# Patient Record
Sex: MALE | Race: WHITE | Employment: FULL TIME | URBAN - METROPOLITAN AREA
[De-identification: names, ages, dates, MRNs, and addresses within clinical notes are randomized per-mention and may not be internally consistent; named-entity substitution may affect disease eponyms.]

---

## 2020-03-27 ENCOUNTER — APPOINTMENT (OUTPATIENT)
Dept: GENERAL RADIOLOGY | Age: 55
End: 2020-03-27
Attending: STUDENT IN AN ORGANIZED HEALTH CARE EDUCATION/TRAINING PROGRAM
Payer: COMMERCIAL

## 2020-03-27 ENCOUNTER — HOSPITAL ENCOUNTER (OUTPATIENT)
Age: 55
Setting detail: OBSERVATION
Discharge: HOME | End: 2020-03-27
Attending: EMERGENCY MEDICINE | Admitting: INTERNAL MEDICINE
Payer: COMMERCIAL

## 2020-03-27 VITALS
OXYGEN SATURATION: 97 % | WEIGHT: 190 LBS | TEMPERATURE: 97.6 F | HEART RATE: 83 BPM | HEIGHT: 66 IN | RESPIRATION RATE: 23 BRPM | BODY MASS INDEX: 30.53 KG/M2 | SYSTOLIC BLOOD PRESSURE: 114 MMHG | DIASTOLIC BLOOD PRESSURE: 61 MMHG

## 2020-03-27 DIAGNOSIS — I21.4 NON-STEMI (NON-ST ELEVATED MYOCARDIAL INFARCTION) (HCC): ICD-10-CM

## 2020-03-27 PROBLEM — I25.10 CAD (CORONARY ARTERY DISEASE): Status: ACTIVE | Noted: 2020-03-27

## 2020-03-27 LAB
ALBUMIN SERPL-MCNC: 3.9 G/DL (ref 3.5–5)
ALBUMIN/GLOB SERPL: 1.3 {RATIO} (ref 1.1–2.2)
ALP SERPL-CCNC: 66 U/L (ref 45–117)
ALT SERPL-CCNC: 22 U/L (ref 12–78)
ANION GAP SERPL CALC-SCNC: 6 MMOL/L (ref 5–15)
AST SERPL-CCNC: 16 U/L (ref 15–37)
ATRIAL RATE: 71 BPM
ATRIAL RATE: 85 BPM
BASOPHILS # BLD: 0.1 K/UL (ref 0–0.1)
BASOPHILS NFR BLD: 1 % (ref 0–1)
BILIRUB SERPL-MCNC: 0.6 MG/DL (ref 0.2–1)
BUN SERPL-MCNC: 14 MG/DL (ref 6–20)
BUN/CREAT SERPL: 14 (ref 12–20)
CALCIUM SERPL-MCNC: 8.8 MG/DL (ref 8.5–10.1)
CALCULATED P AXIS, ECG09: 2 DEGREES
CALCULATED P AXIS, ECG09: 46 DEGREES
CALCULATED R AXIS, ECG10: 102 DEGREES
CALCULATED R AXIS, ECG10: 80 DEGREES
CALCULATED T AXIS, ECG11: 37 DEGREES
CALCULATED T AXIS, ECG11: 50 DEGREES
CHLORIDE SERPL-SCNC: 107 MMOL/L (ref 97–108)
CHOLEST SERPL-MCNC: 217 MG/DL
CO2 SERPL-SCNC: 24 MMOL/L (ref 21–32)
COMMENT, HOLDF: NORMAL
CREAT SERPL-MCNC: 1.01 MG/DL (ref 0.7–1.3)
DIAGNOSIS, 93000: NORMAL
DIAGNOSIS, 93000: NORMAL
DIFFERENTIAL METHOD BLD: NORMAL
EOSINOPHIL # BLD: 0.4 K/UL (ref 0–0.4)
EOSINOPHIL NFR BLD: 4 % (ref 0–7)
ERYTHROCYTE [DISTWIDTH] IN BLOOD BY AUTOMATED COUNT: 13.2 % (ref 11.5–14.5)
GLOBULIN SER CALC-MCNC: 3.1 G/DL (ref 2–4)
GLUCOSE SERPL-MCNC: 99 MG/DL (ref 65–100)
HCT VFR BLD AUTO: 48.8 % (ref 36.6–50.3)
HDLC SERPL-MCNC: 29 MG/DL
HDLC SERPL: 7.5 {RATIO} (ref 0–5)
HGB BLD-MCNC: 16.7 G/DL (ref 12.1–17)
IMM GRANULOCYTES # BLD AUTO: 0 K/UL (ref 0–0.04)
IMM GRANULOCYTES NFR BLD AUTO: 0 % (ref 0–0.5)
LDLC SERPL CALC-MCNC: 122.8 MG/DL (ref 0–100)
LIPID PROFILE,FLP: ABNORMAL
LYMPHOCYTES # BLD: 2.3 K/UL (ref 0.8–3.5)
LYMPHOCYTES NFR BLD: 21 % (ref 12–49)
MCH RBC QN AUTO: 30.5 PG (ref 26–34)
MCHC RBC AUTO-ENTMCNC: 34.2 G/DL (ref 30–36.5)
MCV RBC AUTO: 89.1 FL (ref 80–99)
MONOCYTES # BLD: 1 K/UL (ref 0–1)
MONOCYTES NFR BLD: 9 % (ref 5–13)
NEUTS SEG # BLD: 7.2 K/UL (ref 1.8–8)
NEUTS SEG NFR BLD: 65 % (ref 32–75)
NRBC # BLD: 0 K/UL (ref 0–0.01)
NRBC BLD-RTO: 0 PER 100 WBC
P-R INTERVAL, ECG05: 170 MS
P-R INTERVAL, ECG05: 188 MS
PLATELET # BLD AUTO: 222 K/UL (ref 150–400)
PMV BLD AUTO: 10.1 FL (ref 8.9–12.9)
POTASSIUM SERPL-SCNC: 4 MMOL/L (ref 3.5–5.1)
PROT SERPL-MCNC: 7 G/DL (ref 6.4–8.2)
Q-T INTERVAL, ECG07: 390 MS
Q-T INTERVAL, ECG07: 410 MS
QRS DURATION, ECG06: 88 MS
QRS DURATION, ECG06: 98 MS
QTC CALCULATION (BEZET), ECG08: 445 MS
QTC CALCULATION (BEZET), ECG08: 464 MS
RBC # BLD AUTO: 5.48 M/UL (ref 4.1–5.7)
SAMPLES BEING HELD,HOLD: NORMAL
SODIUM SERPL-SCNC: 137 MMOL/L (ref 136–145)
TRIGL SERPL-MCNC: 326 MG/DL (ref ?–150)
TROPONIN I SERPL-MCNC: <0.05 NG/ML
VENTRICULAR RATE, ECG03: 71 BPM
VENTRICULAR RATE, ECG03: 85 BPM
VLDLC SERPL CALC-MCNC: 65.2 MG/DL
WBC # BLD AUTO: 11 K/UL (ref 4.1–11.1)

## 2020-03-27 PROCEDURE — 77030004532 HC CATH ANGI DX IMP BSC -A: Performed by: INTERNAL MEDICINE

## 2020-03-27 PROCEDURE — 74011636320 HC RX REV CODE- 636/320: Performed by: INTERNAL MEDICINE

## 2020-03-27 PROCEDURE — C1874 STENT, COATED/COV W/DEL SYS: HCPCS | Performed by: INTERNAL MEDICINE

## 2020-03-27 PROCEDURE — 74011250637 HC RX REV CODE- 250/637: Performed by: EMERGENCY MEDICINE

## 2020-03-27 PROCEDURE — 77030013744: Performed by: INTERNAL MEDICINE

## 2020-03-27 PROCEDURE — 92929 HC PLC DE STNT +/-PTA MAJOR COR VESL/BRNCH  ADD LC: CPT | Performed by: INTERNAL MEDICINE

## 2020-03-27 PROCEDURE — 99152 MOD SED SAME PHYS/QHP 5/>YRS: CPT | Performed by: INTERNAL MEDICINE

## 2020-03-27 PROCEDURE — C1725 CATH, TRANSLUMIN NON-LASER: HCPCS | Performed by: INTERNAL MEDICINE

## 2020-03-27 PROCEDURE — 80053 COMPREHEN METABOLIC PANEL: CPT

## 2020-03-27 PROCEDURE — 74011250637 HC RX REV CODE- 250/637: Performed by: INTERNAL MEDICINE

## 2020-03-27 PROCEDURE — C1769 GUIDE WIRE: HCPCS | Performed by: INTERNAL MEDICINE

## 2020-03-27 PROCEDURE — 85347 COAGULATION TIME ACTIVATED: CPT

## 2020-03-27 PROCEDURE — 77030012468 HC VLV BLEEDBK CNTRL ABBT -B: Performed by: INTERNAL MEDICINE

## 2020-03-27 PROCEDURE — 85025 COMPLETE CBC W/AUTO DIFF WBC: CPT

## 2020-03-27 PROCEDURE — 93005 ELECTROCARDIOGRAM TRACING: CPT

## 2020-03-27 PROCEDURE — 74011000250 HC RX REV CODE- 250: Performed by: INTERNAL MEDICINE

## 2020-03-27 PROCEDURE — 77030013715 HC INFL SYS MRTM -B: Performed by: INTERNAL MEDICINE

## 2020-03-27 PROCEDURE — 92928 PRQ TCAT PLMT NTRAC ST 1 LES: CPT | Performed by: INTERNAL MEDICINE

## 2020-03-27 PROCEDURE — 99218 HC RM OBSERVATION: CPT

## 2020-03-27 PROCEDURE — C1894 INTRO/SHEATH, NON-LASER: HCPCS | Performed by: INTERNAL MEDICINE

## 2020-03-27 PROCEDURE — 92978 ENDOLUMINL IVUS OCT C 1ST: CPT | Performed by: INTERNAL MEDICINE

## 2020-03-27 PROCEDURE — 99153 MOD SED SAME PHYS/QHP EA: CPT | Performed by: INTERNAL MEDICINE

## 2020-03-27 PROCEDURE — 77030010221 HC SPLNT WR POS TELE -B: Performed by: INTERNAL MEDICINE

## 2020-03-27 PROCEDURE — 74011250636 HC RX REV CODE- 250/636: Performed by: INTERNAL MEDICINE

## 2020-03-27 PROCEDURE — 77030019569 HC BND COMPR RAD TERU -B: Performed by: INTERNAL MEDICINE

## 2020-03-27 PROCEDURE — 99285 EMERGENCY DEPT VISIT HI MDM: CPT

## 2020-03-27 PROCEDURE — C1753 CATH, INTRAVAS ULTRASOUND: HCPCS | Performed by: INTERNAL MEDICINE

## 2020-03-27 PROCEDURE — 71046 X-RAY EXAM CHEST 2 VIEWS: CPT

## 2020-03-27 PROCEDURE — 93458 L HRT ARTERY/VENTRICLE ANGIO: CPT | Performed by: INTERNAL MEDICINE

## 2020-03-27 PROCEDURE — 36415 COLL VENOUS BLD VENIPUNCTURE: CPT

## 2020-03-27 PROCEDURE — 80061 LIPID PANEL: CPT

## 2020-03-27 PROCEDURE — C1887 CATHETER, GUIDING: HCPCS | Performed by: INTERNAL MEDICINE

## 2020-03-27 PROCEDURE — 84484 ASSAY OF TROPONIN QUANT: CPT

## 2020-03-27 DEVICE — XIENCE SIERRA™ EVEROLIMUS ELUTING CORONARY STENT SYSTEM 2.25 MM X 12 MM / RAPID-EXCHANGE
Type: IMPLANTABLE DEVICE | Status: FUNCTIONAL
Brand: XIENCE SIERRA™

## 2020-03-27 DEVICE — XIENCE SIERRA™ EVEROLIMUS ELUTING CORONARY STENT SYSTEM 3.25 MM X 18 MM / RAPID-EXCHANGE
Type: IMPLANTABLE DEVICE | Status: FUNCTIONAL
Brand: XIENCE SIERRA™

## 2020-03-27 RX ORDER — CLOPIDOGREL BISULFATE 75 MG/1
75 TABLET ORAL DAILY
Status: DISCONTINUED | OUTPATIENT
Start: 2020-03-28 | End: 2020-03-27 | Stop reason: HOSPADM

## 2020-03-27 RX ORDER — ASPIRIN 325 MG
325 TABLET ORAL DAILY
Qty: 90 TAB | Refills: 5 | Status: ON HOLD | OUTPATIENT
Start: 2020-03-27 | End: 2021-04-23

## 2020-03-27 RX ORDER — HEPARIN SODIUM 1000 [USP'U]/ML
INJECTION, SOLUTION INTRAVENOUS; SUBCUTANEOUS AS NEEDED
Status: DISCONTINUED | OUTPATIENT
Start: 2020-03-27 | End: 2020-03-27 | Stop reason: HOSPADM

## 2020-03-27 RX ORDER — CLOPIDOGREL 300 MG/1
TABLET, FILM COATED ORAL AS NEEDED
Status: DISCONTINUED | OUTPATIENT
Start: 2020-03-27 | End: 2020-03-27 | Stop reason: HOSPADM

## 2020-03-27 RX ORDER — MIDAZOLAM HYDROCHLORIDE 1 MG/ML
INJECTION, SOLUTION INTRAMUSCULAR; INTRAVENOUS AS NEEDED
Status: DISCONTINUED | OUTPATIENT
Start: 2020-03-27 | End: 2020-03-27 | Stop reason: HOSPADM

## 2020-03-27 RX ORDER — ATORVASTATIN CALCIUM 80 MG/1
80 TABLET, FILM COATED ORAL DAILY
Qty: 30 TAB | Refills: 5 | Status: SHIPPED | OUTPATIENT
Start: 2020-03-27 | End: 2020-07-21

## 2020-03-27 RX ORDER — SODIUM CHLORIDE 0.9 % (FLUSH) 0.9 %
5-40 SYRINGE (ML) INJECTION EVERY 8 HOURS
Status: DISCONTINUED | OUTPATIENT
Start: 2020-03-27 | End: 2020-03-27 | Stop reason: HOSPADM

## 2020-03-27 RX ORDER — LIDOCAINE HYDROCHLORIDE 10 MG/ML
INJECTION INFILTRATION; PERINEURAL AS NEEDED
Status: DISCONTINUED | OUTPATIENT
Start: 2020-03-27 | End: 2020-03-27 | Stop reason: HOSPADM

## 2020-03-27 RX ORDER — CLOPIDOGREL BISULFATE 75 MG/1
75 TABLET ORAL DAILY
Qty: 30 TAB | Refills: 5 | Status: SHIPPED | OUTPATIENT
Start: 2020-03-28 | End: 2020-08-24 | Stop reason: SDUPTHER

## 2020-03-27 RX ORDER — HEPARIN SODIUM 200 [USP'U]/100ML
INJECTION, SOLUTION INTRAVENOUS
Status: COMPLETED | OUTPATIENT
Start: 2020-03-27 | End: 2020-03-27

## 2020-03-27 RX ORDER — SODIUM CHLORIDE 0.9 % (FLUSH) 0.9 %
5-40 SYRINGE (ML) INJECTION AS NEEDED
Status: DISCONTINUED | OUTPATIENT
Start: 2020-03-27 | End: 2020-03-27 | Stop reason: HOSPADM

## 2020-03-27 RX ORDER — GUAIFENESIN 100 MG/5ML
81 LIQUID (ML) ORAL DAILY
Status: DISCONTINUED | OUTPATIENT
Start: 2020-03-28 | End: 2020-03-27 | Stop reason: HOSPADM

## 2020-03-27 RX ORDER — ASPIRIN 325 MG
325 TABLET ORAL
Status: COMPLETED | OUTPATIENT
Start: 2020-03-27 | End: 2020-03-27

## 2020-03-27 RX ORDER — SODIUM CHLORIDE 9 MG/ML
500 INJECTION, SOLUTION INTRAVENOUS CONTINUOUS
Status: DISCONTINUED | OUTPATIENT
Start: 2020-03-27 | End: 2020-03-27 | Stop reason: HOSPADM

## 2020-03-27 RX ORDER — FENTANYL CITRATE 50 UG/ML
INJECTION, SOLUTION INTRAMUSCULAR; INTRAVENOUS AS NEEDED
Status: DISCONTINUED | OUTPATIENT
Start: 2020-03-27 | End: 2020-03-27 | Stop reason: HOSPADM

## 2020-03-27 RX ORDER — ASPIRIN 325 MG
TABLET ORAL
Status: DISCONTINUED
Start: 2020-03-27 | End: 2020-03-27 | Stop reason: HOSPADM

## 2020-03-27 RX ADMIN — ASPIRIN 325 MG ORAL TABLET 325 MG: 325 PILL ORAL at 07:29

## 2020-03-27 NOTE — ED TRIAGE NOTES
Pt arrives ambulatory from home with CC of chest pain like someone is squeezing his chest, the pain radiates up his neck and under his jaw. Pt also complains of SOB more so with exertion but he does have exercise induced asthma. Pt did a virtual mateusz with [employer] and they sent him here for further cardiac workup.

## 2020-03-27 NOTE — PROGRESS NOTES
1030 -   Cardiac Cath Lab Procedure Area Arrival Note:    Phill Henry arrived to Cardiac Cath Lab, Procedure Area. Patient identifiers verified with NAME and DATE OF BIRTH. Procedure verified with patient. Consent forms verified. Allergies verified. Patient informed of procedure and plan of care. Questions answered with review. Patient voiced understanding of procedure and plan of care. Patient on cardiac monitor, non-invasive blood pressure, SPO2 monitor. Placed on O2 @ 3 lpm via NC.  IV of NS on pump at 75 ml/hr. Patient status doing well with some problems : NSTEMI. Patient is A&Ox 4. Patient reports no discomfort at this time. Patient medicated during procedure with orders obtained and verified by Dr. Mikael Cabrera. Refer to patients Cardiac Cath Lab PROCEDURE REPORT for vital signs, assessment, status, and response during procedure, printed at end of case. Printed report on chart or scanned into chart. 1219 - TRANSFER - OUT REPORT:    Verbal report given to Gerardo SWANSON(name) on Phill Henry  being transferred to (unit) for routine post - op       Report consisted of patients Situation, Background, Assessment and   Recommendations(SBAR). Information from the following report(s) Procedure Summary and MAR was reviewed with the receiving nurse. Lines:   Peripheral IV 03/27/20 Right Forearm (Active)   Site Assessment Clean, dry, & intact 3/27/2020  7:02 AM   Phlebitis Assessment 0 3/27/2020  7:02 AM   Infiltration Assessment 0 3/27/2020  7:02 AM   Dressing Status Clean, dry, & intact 3/27/2020  7:02 AM        Opportunity for questions and clarification was provided.       Patient transported with:   [employer]

## 2020-03-27 NOTE — DISCHARGE SUMMARY
Cardiology Discharge Summary     Patient ID:  Mahad Rosales  [id number]  23 y.o.  1965    Admit Date: 3/27/2020    Discharge Date: 3/27/2020     Admitting Physician: Madison Felder MD     Discharge Physician: Tracy Rodriguez MD    Admission Diagnoses: CAD (coronary artery disease) [I25.10]    Discharge Diagnoses: Active Problems:    CAD (coronary artery disease) (3/27/2020)        Discharge Condition: Good    Cardiology Procedures this Admission:  Left heart catheterization with PCI    Hospital Course: patient admitted with recent h/o cp highly s/o [place]  ecg with no acute ischemic changes and troponin normal  Underwent  Cardiac cath as below  03/27/20   CARDIAC PROCEDURE 03/27/2020 3/27/2020    Narrative Findings:  1)NSTEMI  2)Low LVEDP  3)Culprit lesions in OM2(90%) and distal LCx(90%) treated with 2.25 by 12   mm and 3.25 by 18 mm Xience FERNANDA, optimized with IVUS guidance using 2.25   and 3.5 NC balloons. 4)Mild to moderate disease in Lad and RCA were deferred for medical mg. Contrast 110 cc    Access: right radial--no issues    Recommendations:   1)Plavix based DAPT  2)Smoking cessation  3)HIgh intensity statins and GDMT for secondary prevention.        Signed by: Raul Tidwell MD     Patient completely asymptomatic from out of town ([address] )    no driving or working at this time  He will stay with his daughter in [place]  Discussed absolute need for plavix and asa with no interruptions   increase lipitor to max (ldl 122)   tobacco cessation!!          Consults: Cardiology  Visit Vitals  /51 (BP 1 Location: Left arm, BP Patient Position: At rest)   Pulse 75   Temp 97.6 °F (36.4 °C)   Resp 23   Ht 5' 6\" (1.676 m)   Wt 190 lb (86.2 kg)   SpO2 96%   BMI 30.67 kg/m²       Discharge Exam:    Visit Vitals  /51 (BP 1 Location: Left arm, BP Patient Position: At rest)   Pulse 75   Temp 97.6 °F (36.4 °C)   Resp 23   Ht 5' 6\" (1.676 m)   Wt 190 lb (86.2 kg)   SpO2 96%   BMI 30.67 kg/m² Recent Results (from the past 24 hour(s))   EKG, 12 LEAD, INITIAL    Collection Time: 03/27/20  6:51 AM   Result Value Ref Range    Ventricular Rate 85 BPM    Atrial Rate 85 BPM    P-R Interval 170 ms    QRS Duration 98 ms    Q-T Interval 390 ms    QTC Calculation (Bezet) 464 ms    Calculated P Axis 46 degrees    Calculated R Axis 102 degrees    Calculated T Axis 50 degrees    Diagnosis       Normal sinus rhythm  Nonspecific ST abnormality  No previous ECGs available  Confirmed by Elidia Cota M.D., Jasmin Stephen ([id number]) on 3/27/2020 10:11:23 AM     SAMPLES BEING HELD    Collection Time: 03/27/20  6:59 AM   Result Value Ref Range    SAMPLES BEING HELD 1BLU,1RED     COMMENT        Add-on orders for these samples will be processed based on acceptable specimen integrity and analyte stability, which may vary by analyte. CBC WITH AUTOMATED DIFF    Collection Time: 03/27/20  6:59 AM   Result Value Ref Range    WBC 11.0 4.1 - 11.1 K/uL    RBC 5.48 4.10 - 5.70 M/uL    HGB 16.7 12.1 - 17.0 g/dL    HCT 48.8 36.6 - 50.3 %    MCV 89.1 80.0 - 99.0 FL    MCH 30.5 26.0 - 34.0 PG    MCHC 34.2 30.0 - 36.5 g/dL    RDW 13.2 11.5 - 14.5 %    PLATELET 791 018 - 558 K/uL    MPV 10.1 8.9 - 12.9 FL    NRBC 0.0 0  WBC    ABSOLUTE NRBC 0.00 0.00 - 0.01 K/uL    NEUTROPHILS 65 32 - 75 %    LYMPHOCYTES 21 12 - 49 %    MONOCYTES 9 5 - 13 %    EOSINOPHILS 4 0 - 7 %    BASOPHILS 1 0 - 1 %    IMMATURE GRANULOCYTES 0 0.0 - 0.5 %    ABS. NEUTROPHILS 7.2 1.8 - 8.0 K/UL    ABS. LYMPHOCYTES 2.3 0.8 - 3.5 K/UL    ABS. MONOCYTES 1.0 0.0 - 1.0 K/UL    ABS. EOSINOPHILS 0.4 0.0 - 0.4 K/UL    ABS. BASOPHILS 0.1 0.0 - 0.1 K/UL    ABS. IMM.  GRANS. 0.0 0.00 - 0.04 K/UL    DF AUTOMATED     METABOLIC PANEL, COMPREHENSIVE    Collection Time: 03/27/20  6:59 AM   Result Value Ref Range    Sodium 137 136 - 145 mmol/L    Potassium 4.0 3.5 - 5.1 mmol/L    Chloride 107 97 - 108 mmol/L    CO2 24 21 - 32 mmol/L    Anion gap 6 5 - 15 mmol/L    Glucose 99 65 - 100 mg/dL BUN 14 6 - 20 MG/DL    Creatinine 1.01 0.70 - 1.30 MG/DL    BUN/Creatinine ratio 14 12 - 20      GFR est AA >60 >60 ml/min/1.73m2    GFR est non-AA >60 >60 ml/min/1.73m2    Calcium 8.8 8.5 - 10.1 MG/DL    Bilirubin, total 0.6 0.2 - 1.0 MG/DL    ALT (SGPT) 22 12 - 78 U/L    AST (SGOT) 16 15 - 37 U/L    Alk. phosphatase 66 45 - 117 U/L    Protein, total 7.0 6.4 - 8.2 g/dL    Albumin 3.9 3.5 - 5.0 g/dL    Globulin 3.1 2.0 - 4.0 g/dL    A-G Ratio 1.3 1.1 - 2.2     TROPONIN I    Collection Time: 03/27/20  6:59 AM   Result Value Ref Range    Troponin-I, Qt. <0.05 <0.05 ng/mL   LIPID PANEL    Collection Time: 03/27/20  6:59 AM   Result Value Ref Range    LIPID PROFILE          Cholesterol, total 217 (H) <200 MG/DL    Triglyceride 326 (H) <150 MG/DL    HDL Cholesterol 29 MG/DL    LDL, calculated 122.8 (H) 0 - 100 MG/DL    VLDL, calculated 65.2 MG/DL    CHOL/HDL Ratio 7.5 (H) 0.0 - 5.0         Disposition: home             Patient Instructions:   Asa 325 mg daily ( to be changed as outpatient to 81 mg daily)  plavix 75 mg daily  lipitor 80 mg daily      Referenced discharge instructions provided by nursing for diet and activity.     Follow-up with Dr. Wiley Walls in 1 week for tele medicine     Signed:  Malachi Alva MD  3/27/2020  1:59 PM

## 2020-03-27 NOTE — PROCEDURES
Findings:  1)NSTEMI  2)Low LVEDP  3)Culprit lesions in OM2(90%) and distal LCx(90%) treated with 2.25 by 12 mm and 3.25 by 18 mm Xience FERNANDA, optimized with IVUS guidance using 2.25 and 3.5 NC balloons. 4)Mild to moderate disease in Lad and RCA were deferred for medical mg. Contrast 110 cc    Access: right radial--no issues    Recommendations:   1)Plavix based DAPT  2)Smoking cessation  3)HIgh intensity statins and GDMT for secondary prevention.

## 2020-03-27 NOTE — Clinical Note
Right groin and right radial prepped with ChloraPrep and draped. Wet prep solution applied at: 1037. Wet prep solution dried at: 1041. Wet prep elapsed drying time: 4 mins.

## 2020-03-27 NOTE — PROGRESS NOTES
Cardiac Cath Lab Recovery Arrival Note:      Miles Liu arrived to Cardiac Cath Lab, Recovery Area. Staff introduced to patient. Patient identifiers verified with NAME and DATE OF BIRTH. Procedure verified with patient. Consent forms reviewed and signed by patient or authorized representative and verified. Allergies verified. Patient and family oriented to department. Patient and family informed of procedure and plan of care. Questions answered with review. Patient prepped for procedure, per orders from physician, prior to arrival.    Patient on cardiac monitor, non-invasive blood pressure, SPO2 monitor. On RA. Patient is A&Ox 4. Patient reports no c/o's. Patient in stretcher, in low position, with side rails up, call bell within reach, patient instructed to call if assistance as needed. Patient prep in: [address], [place] [address]. Patient family has pager # n/a  Family in: hospital.   Prep by: PRESLEY Barber

## 2020-03-27 NOTE — Clinical Note
Lesion located in the Mid OM 2. Balloon inserted. Balloon inflated using multiple inflations inflation technique. Lesion #1: Pressure = 16 jesús; Duration = 21 sec. Inflation 2: Pressure = 14 jesús; Duration = 20 sec.

## 2020-03-27 NOTE — Clinical Note
Single view of the left main, left coronary artery, LAD and circumflex artery obtained using power injection.

## 2020-03-27 NOTE — Clinical Note
Lesion located in the Mid Cx. Balloon balloon re-inflated. Balloon inflated using multiple inflations inflation technique. Lesion #1: Pressure = 20 jesús; Duration = 18 sec. Inflation 2: Pressure = 24 jesús; Duration = 20 sec.

## 2020-03-27 NOTE — PROGRESS NOTES
TRANSFER - IN REPORT:    Verbal report received from Anthony Caraballo on Keerthi Whyte  being received from procedural area for routine progression of care. Report consisted of patients Situation, Background, Assessment and Recommendations(SBAR). Information from the following report(s) Procedure Summary, MAR and Recent Results was reviewed with the receiving clinician. Opportunity for questions and clarification was provided. Assessment completed upon patients arrival to [address] [place] [address] and care assumed. Cardiac Cath Lab Recovery Arrival Note:    Keerthi Whyte arrived to [facility] recovery area. Patient procedure= C. Patient on cardiac monitor, non-invasive blood pressure, SPO2 monitor. On \ or O2 @ 2 lpm via NC.  IV  of NS on pump at 125 ml/hr. Patient status doing well without problems. Patient is A&Ox 3. Patient reports no c/o's. PROCEDURE SITE CHECK:    Procedure site:without any bleeding and hematoma, no pain/discomfort reported at procedure site. No change in patient status. Continue to monitor patient and status.

## 2020-03-27 NOTE — Clinical Note
Lesion located in the Mid OM 2. Balloon inserted. Balloon inflated using multiple inflations inflation technique. Lesion #1: Pressure = 6 jesús; Duration = 5 sec. Inflation 2: Pressure = 6 jesús; Duration = 60 sec. Inflation 3: Pressure = 8 jesús; Duration = 60 sec. Inflation 4: Pressure = 8 jesús; Duration = 45 sec.

## 2020-03-27 NOTE — Clinical Note
Lesion located in the Mid Cx. Balloon inserted. Balloon inflated using single inflation technique. Lesion #1: Pressure = 20 jesús; Duration = 30 sec.

## 2020-03-27 NOTE — Clinical Note
Lesion: Located in the Mid Cx. Stent inserted. Stent deployed. Single technique used. First inflation pressure = 12 jesús; inflation time: 31 sec.

## 2020-03-27 NOTE — Clinical Note
Lesion located in the Mid Cx. Balloon inserted. Balloon inflated using multiple inflations inflation technique. Lesion #1: Pressure = 8 jesús; Duration = 12 sec. Inflation 2: Pressure = 12 jesús; Duration = 32 sec.

## 2020-03-27 NOTE — ED NOTES
Verbal shift change report given to [address] [place] [address] [address] (oncoming nurse) by Maria Luz Gaitan RN (offgoing nurse). Report included the following information SBAR, Kardex, ED Summary, [facility] and Recent Results.

## 2020-03-27 NOTE — Clinical Note
Lesion: Located in the Mid OM 2. Stent inserted. Stent deployed. Single technique used. First inflation pressure = 10 jesús; inflation time: 46 sec.

## 2020-03-27 NOTE — CONSULTS
Cardiology Consult Note      Patient Name: Hai Pablo  : 1965 MRN: [id number]  Date: 3/27/2020  Time: 8:58 AM    ED Diagnosis: chest pain    Primary Cardiologist: none   Consulting Cardiologist: John Garcia MD    Reason for Consult: chest pain    Requesting MD: Dr. Gabriela Kraft    HPI:  Hai Pablo is a 62 y.o. male evaluated in the ED on 3/27/2020  for [place]. Pmhx for HLD, colon CA, s/p resection and tobacco abuse. He presents for a 2 week h/o progressive chest pressure/tightness. He describes the pressure as if someone is squeezing his chest and heart. When the pain gets bad enough, it radiates up his neck and jaw. Getting worse over the past 2 weeks, occurs with activity. He usually stops and rests, the pain will resolve. His morning he awoke and walked to the front door, began having chest pressure. He decided to come to the ED following a virtual mateusz the asked for s/sx, RF and other pertinent information. Stable in the ED. No Cp, SOB. EKG NSR    Subjective:  Denies CP, SOB or palpitations. No edema of legs. Assessment and Plan     1. [place]   - progressive chest tightness with radiation to neck and jaw   - occurs with activity, but now is occurring with minimal ADLs   - EKG without ACS   - RF include male, HLD, tobacco abuse, FmHx  2. Tobacco abuse   - 2 PPD for 35+ years   - discussed the need to quit  3. HLD   Cholesterol, total 217 (H) 2020 06:59 AM   HDL Cholesterol 29 2020 06:59 AM   LDL, calculated 122.8 (H) 2020 06:59 AM   VLDL, calculated 65.2 2020 06:59 AM   Triglyceride 326 (H) 2020 06:59 AM   CHOL/HDL Ratio 7.5 (H) 2020 06:59 AM    - States he does take a statin drug    [place] that is progressing, RF including tobacco abuse and FMHx. Recommend cardiac cath.   Discussed risks and benefits of cardiac cath +/- PCI and patient agrees to proceed  Risk is 1 in 100 for bleeding, prolonged hospital stay, groin complications, infection, tear in cardiac vessel, tamponade or deterioration in kidney function for patients with baseline kidney dysfunction prior to procedure  Risk is 1 in 1,000 of heart attack, stroke or death    NPO for procedures. I have seen and examined the patient and agree with PA assessment. Clinical history highly suggestive of [place]  ECG with no acute ischemic changes  Troponin normal thus far  Proceed with cath  Patient aware of risks and benefits  Increase statin prior to dc        There is no problem list on file for this patient. No specialty comments available. Review of Systems:    [] Patient unable to provide secondary to condition    [x] All systems negative, except as checked below.   Constitutional:    []Weight Change  []Fever   []Chills   []Night Sweats  []Fatigue  []Malaise  []____  ENT/Mouth:     []Hearing Changes  []Ear Pain  []Nasal Congestion   []Sinus Pain  []Hoarseness   []Sore throat  []Rhinorrhea  []Swallowing Difficulty  []____  Eyes:    []Eye Pain  []Swelling  []Redness  []Foreign Body  []Discharge  []Vision Changes  []____  Cardiovascular:    [x]Chest Pain  [x]SOB  []PND  []YANES  []Orthopnea  []Claudication  []Edema   []Palpitations  []____  Respiratory:    []Cough  []Sputum  []Wheezing,  []SOB  []Hemoptysis  []____  Gastrointestinal:    []Nausea  []Vomiting  []Diarrhea  []Constipation  []Pain  []Heartburn  []Anorexia  []Dysphagia  []Hematochezia  []Melena,  []Jaundice  []____  Genitourinary:    []Dysuria  []Urinary Frequency  []Hematuria  []Urinary Incontinence  []Urgency  []Flank Pain  []Hesitancy  []____  Musculoskeletal:    []Arthralgias  []Myalgias  []Joint Swelling  []Joint Stiffness  []Back Pain  []Neck Pain  []____  Skin:    []Skin Lesions  []Pruritis  []Hair Changes  []Skin rashes  []____  Neuro:    []Weakness  []Numbness  []Paresthesias  []Loss of Consciousness  []Syncope   []Dizziness  []Headache  []Coordination Changes []Recent Falls  []____  Psych:    []Anxiety/Depression  []Insomnia  []Memory Changes  []Violence/Abuse Hx.  []____  Heme/Lymph:    []Bruising  []Bleeding  []Lymphadenopathy  []____  Endocrine:    []Polyuria  []Polydipsia  []Temperature Intolerance  []____         Previous treatment/evaluation includes   none  Cardiac risk factors:   smoking/ tobacco exposure, family history, dyslipidemia, male gender. History reviewed. No pertinent past medical history. History reviewed. No pertinent surgical history. Current Facility-Administered Medications   Medication Dose Route Frequency    heparinized saline 2 units/mL infusion    CONTINUOUS       No Known Allergies   History reviewed. No pertinent family history. Social History     Socioeconomic History    Marital status:      Spouse name: Not on file    Number of children: Not on file    Years of education: Not on file    Highest education level: Not on file       Objective:    Physical Exam    Vitals:   Vitals:    03/27/20 0720 03/27/20 1020 03/27/20 1033 03/27/20 1037   BP: 122/81   123/81   Pulse: 84 84  80   Resp: 18   22   Temp:       SpO2: 93%   97%   Weight:   190 lb (86.2 kg)    Height:   5' 6\" (1.676 m)        General:    Alert, cooperative, no distress, appears stated age. Neck:   Supple, no carotid bruit and no JVD. Back:     Symmetric, normal curvature. Lungs:     Coarse BS to auscultation bilaterally. Heart[de-identified]    Regular rate and rhythm, S1, S2 normal, no murmur, click, rub or gallop. Abdomen:     Soft, non-tender. Bowel sounds normal.    Extremities:   Extremities normal, atraumatic, no cyanosis or edema. Vascular:   Pulses - 2+ radials   Skin:   Skin color normal. No rashes or lesions   Neurologic:   CN II-XII grossly intact.         Telemetry: normal sinus rhythm    ECG:   EKG Results     Procedure 720 Value Units Date/Time    EKG 12 LEAD INITIAL [[id number]] Collected:  03/27/20 0651    Order Status:  Completed Updated: 03/27/20 1011     Ventricular Rate 85 BPM      Atrial Rate 85 BPM      P-R Interval 170 ms      QRS Duration 98 ms      Q-T Interval 390 ms      QTC Calculation (Bezet) 464 ms      Calculated P Axis 46 degrees      Calculated R Axis 102 degrees      Calculated T Axis 50 degrees      Diagnosis --     Normal sinus rhythm  Nonspecific ST abnormality  No previous ECGs available  Confirmed by Claudio Barnes M.D., Tiarra Zarate ([id number]) on 3/27/2020 10:11:23 AM              Data Review:     Radiology:   XR Results (most recent):  Results from [place] encounter on 03/27/20   XR CHEST PA LAT    Narrative Clinical history: eval for chest pain  INDICATION:   eval for chest pain  COMPARISON: None    FINDINGS:   PA and lateral views of the chest are obtained. The cardiopericardial silhouette is within normal limits. There is no pleural  effusion, pneumothorax or focal consolidation present. Impression IMPRESSION: No acute intrathoracic disease. Recent Labs     03/27/20  0659   TROIQ <0.05     Recent Labs     03/27/20  0659      K 4.0      CO2 24   BUN 14   CREA 1.01   GLU 99   CA 8.8     Recent Labs     03/27/20  0659   WBC 11.0   HGB 16.7   HCT 48.8        Recent Labs     03/27/20  0659   SGOT 16   AP 66     Recent Labs     03/27/20  0659   CHOL 217*   LDLC 122.8*     No results for input(s): CRP, TSH, TSHEXT, TSHEXT in the last 72 hours. No lab exists for component: ESR    Carri Kenny.  Daniella Rajan MD         [facility] [address] [place] [address],[address]     [place] [id number]

## 2020-03-27 NOTE — Clinical Note
Lesion located in the Mid Cx. Balloon inserted. Balloon inflated using single inflation technique. Lesion #1: Pressure = 12 jesús; Duration = 35 sec.

## 2020-03-27 NOTE — ED PROVIDER NOTES
HPI     Pt is a 62 y.o. M with PMH of asthma here with c/o chest pain and shortness of breath intermittent x 2 weeks. Pt says is more often with exertion but occasionally also occurs at rest.  This AM it occurred while trying to walk his dog. It feels like a tightness to his chest and radiates up to his neck. It lasts about 5 minutes when it occurs. He only has shortness of breath with no other associated symptoms. He takes Aleve daily for knee pain but denies other medications. No h/o PE/DVT and no risk factors. He does smoke and has family hx of CAD. No other complaints at this time, and he does not have chest pain at this time. History reviewed. No pertinent past medical history. History reviewed. No pertinent surgical history. History reviewed. No pertinent family history.     Social History     Socioeconomic History    Marital status: Not on file     Spouse name: Not on file    Number of children: Not on file    Years of education: Not on file    Highest education level: Not on file   Occupational History    Not on file   Social Needs    Financial resource strain: Not on file    Food insecurity     Worry: Not on file     Inability: Not on file    Transportation needs     Medical: Not on file     Non-medical: Not on file   Tobacco Use    Smoking status: Not on file   Substance and Sexual Activity    Alcohol use: Not on file    Drug use: Not on file    Sexual activity: Not on file   Lifestyle    Physical activity     Days per week: Not on file     Minutes per session: Not on file    Stress: Not on file   Relationships    Social connections     Talks on phone: Not on file     Gets together: Not on file     Attends [employer] service: Not on file     Active member of club or organization: Not on file     Attends meetings of clubs or organizations: Not on file     Relationship status: Not on file    Intimate partner violence     Fear of current or ex partner: Not on file Emotionally abused: Not on file     Physically abused: Not on file     Forced sexual activity: Not on file   Other Topics Concern    Not on file   Social History Narrative    Not on file         ALLERGIES: Patient has no known allergies. Review of Systems   Constitutional: Negative for chills, diaphoresis and fever. HENT: Negative for congestion and trouble swallowing. Eyes: Negative for photophobia and visual disturbance. Respiratory: Positive for shortness of breath. Negative for cough and chest tightness. Cardiovascular: Positive for chest pain. Negative for palpitations and leg swelling. Gastrointestinal: Negative for abdominal pain, diarrhea, nausea and vomiting. Genitourinary: Negative for difficulty urinating, dysuria, flank pain and frequency. Musculoskeletal: Positive for arthralgias (chronic knee pain) and neck pain. Negative for back pain and myalgias. Skin: Negative for rash and wound. Neurological: Negative for dizziness, weakness, light-headedness and headaches. Hematological: Negative for adenopathy. Does not bruise/bleed easily. Psychiatric/Behavioral: Negative for agitation and confusion. All other systems reviewed and are negative. Vitals:    03/27/20 0634   BP: 148/85   Pulse: 86   Resp: 16   Temp: 97.6 °F (36.4 °C)   SpO2: 98%            Physical Exam  Vitals signs and nursing note reviewed. Constitutional:       General: He is not in acute distress. Appearance: He is well-developed. He is not diaphoretic. HENT:      Head: Normocephalic. Eyes:      Conjunctiva/sclera: Conjunctivae normal.      Pupils: Pupils are equal, round, and reactive to light. Neck:      Musculoskeletal: Normal range of motion and neck supple. Vascular: No JVD. Cardiovascular:      Rate and Rhythm: Normal rate and regular rhythm. Heart sounds: Normal heart sounds. Pulmonary:      Effort: Pulmonary effort is normal.      Breath sounds: Normal breath sounds. Abdominal:      General: Bowel sounds are normal. There is no distension. Palpations: Abdomen is soft. Tenderness: There is no abdominal tenderness. Musculoskeletal: Normal range of motion. General: No tenderness or deformity. Lymphadenopathy:      Cervical: No cervical adenopathy. Skin:     General: Skin is warm and dry. Capillary Refill: Capillary refill takes less than 2 seconds. Findings: No erythema or rash. Neurological:      Mental Status: He is alert and oriented to person, place, and time. Cranial Nerves: No cranial nerve deficit. Sensory: No sensory deficit. MDM       Procedures    ED EKG interpretation:  Rhythm: normal sinus rhythm; and regular . Rate (approx.): 85; Axis: normal; P wave: normal; QRS interval: normal ; ST/T wave: non-specific ST abnormality  EKG documented by Law Smith MD, as interpreted by Dilia Weaver MD, ED MD.      8:02 AM  Will consult cardiology for help with dispo. Pt has low HEART score. However, he has tightness and dyspnea worsening with exertion. Previously he could do a lot of work, ie yard work, without pain but now worse with little exertion like this AM.  EKG with nonspecific ST findings and normal trop after 2 weeks of intermittent sx. May benefit from stress or cath from what seems to be unstable angina at the very least.    CONSULT NOTE:  8:51 Melissa Kessler MD spoke with Dr. Rashida Reis for Cardiology. Discussed available diagnostic tests and clinical findings.   Dr. Marylen Gale and team plan to admit and cath     Law Smith MD

## 2020-03-27 NOTE — PROGRESS NOTES
Pt d/c'ed home with wife. Discharge instructions given and understanding verbalized of follow up. Right wrist dressing is dry and intact upon discharge, no bleeding or hematoma noted. Pt ambulated before discharge and tolerated well. Pt voices no c/o's upon discharge. Pt d/c'ed out via w/c.

## 2020-03-29 LAB
ACT BLD: 257 SECS (ref 79–138)
ACT BLD: 323 SECS (ref 79–138)

## 2020-03-30 ENCOUNTER — TELEPHONE (OUTPATIENT)
Dept: CARDIOLOGY CLINIC | Age: 55
End: 2020-03-30

## 2020-03-30 ENCOUNTER — TELEPHONE (OUTPATIENT)
Dept: CARDIAC REHAB | Age: 55
End: 2020-03-30

## 2020-03-30 NOTE — TELEPHONE ENCOUNTER
Identifiers x 2. Confirmed restrictions lifted 7 days after procedure.       Future Appointments   Date Time Provider Nic Geni   4/14/2020 10:20 AM Loraine Penny  [address]

## 2020-03-30 NOTE — TELEPHONE ENCOUNTER
3/30/2020 Cardiac Rehab: Called Mr. Mela Mitchell  to discuss participation in the Cardiac Rehab Program following cardiac stent on 3/27/2020. Spoke with the pt. and he was referred to [facility] which is closest to his home. Contact information given. Answered questions regarding plavix, the purpose and the side effects to be reported. Mela Mitchell verbalized understanding.   Emily Perry RN

## 2020-03-30 NOTE — TELEPHONE ENCOUNTER
Patient calling to schedule his appt.     He can be reached at [phone]    [facility]

## 2020-04-02 ENCOUNTER — TELEPHONE (OUTPATIENT)
Dept: CARDIOLOGY CLINIC | Age: 55
End: 2020-04-02

## 2020-04-02 NOTE — TELEPHONE ENCOUNTER
Patient is calling as he had two stents put in on Friday and would like to e-mail some paperwork to Dr. Monique Tanner for disability. Advised patient we do not have e-mail and would like to speak with you to see what he can do. Please advise.     Phone: [phone]

## 2020-04-02 NOTE — TELEPHONE ENCOUNTER
2 pt identifiers used  Pt reports he has already taken care of paperwork. He contacted disability & he is having them send paperwork to us. Instructed him to CB in about a week to make sure we received everything.  He verbalized his understanding

## 2020-04-09 ENCOUNTER — TELEPHONE (OUTPATIENT)
Dept: CARDIOLOGY CLINIC | Age: 55
End: 2020-04-09

## 2020-04-09 NOTE — TELEPHONE ENCOUNTER
2 pt identifiers used  Pt wants update on disability forms. He reports forms were faxed in by his ins rep on Friday.  He wants call back at [phone]

## 2020-04-13 ENCOUNTER — TELEPHONE (OUTPATIENT)
Dept: CARDIOLOGY CLINIC | Age: 55
End: 2020-04-13

## 2020-04-13 NOTE — TELEPHONE ENCOUNTER
Patient following up on paperwork that was faxed to #[phone] for his disability paperwork. Please advise.      Phone: [phone]

## 2020-04-14 ENCOUNTER — VIRTUAL VISIT (OUTPATIENT)
Dept: CARDIOLOGY CLINIC | Age: 55
End: 2020-04-14

## 2020-04-14 VITALS
HEIGHT: 66 IN | BODY MASS INDEX: 30.44 KG/M2 | SYSTOLIC BLOOD PRESSURE: 121 MMHG | DIASTOLIC BLOOD PRESSURE: 77 MMHG | WEIGHT: 189.4 LBS

## 2020-04-14 DIAGNOSIS — E78.49 OTHER HYPERLIPIDEMIA: ICD-10-CM

## 2020-04-14 DIAGNOSIS — Z72.0 TOBACCO ABUSE: ICD-10-CM

## 2020-04-14 DIAGNOSIS — I25.10 CORONARY ARTERY DISEASE INVOLVING NATIVE CORONARY ARTERY OF NATIVE HEART WITHOUT ANGINA PECTORIS: Primary | ICD-10-CM

## 2020-04-14 DIAGNOSIS — R07.9 CHEST PAIN, UNSPECIFIED TYPE: ICD-10-CM

## 2020-04-14 RX ORDER — OMEPRAZOLE 40 MG/1
40 CAPSULE, DELAYED RELEASE ORAL 2 TIMES DAILY
COMMUNITY
End: 2020-07-20

## 2020-04-14 RX ORDER — ISOSORBIDE MONONITRATE 30 MG/1
30 TABLET, EXTENDED RELEASE ORAL DAILY
Qty: 90 TAB | Refills: 1 | Status: SHIPPED | OUTPATIENT
Start: 2020-04-14 | End: 2020-04-14 | Stop reason: SDUPTHER

## 2020-04-14 RX ORDER — MOMETASONE FUROATE AND FORMOTEROL FUMARATE DIHYDRATE 100; 5 UG/1; UG/1
2 AEROSOL RESPIRATORY (INHALATION) 2 TIMES DAILY
COMMUNITY

## 2020-04-14 RX ORDER — ISOSORBIDE MONONITRATE 30 MG/1
30 TABLET, EXTENDED RELEASE ORAL DAILY
Qty: 90 TAB | Refills: 1 | Status: SHIPPED | OUTPATIENT
Start: 2020-04-14 | End: 2020-07-20

## 2020-04-14 NOTE — TELEPHONE ENCOUNTER
Disability forms completed and signed by Dr. Cathi Lepe. Faxed to [address] [place] [address] at [phone]. Confirmation received. Copy sent to patient for his records. Virtual visit today. Informed patient of the above.

## 2020-04-14 NOTE — PROGRESS NOTES
[facility] Telemedicine Encounter                                                         Pursuant to the emergency declaration under the [facility] waiver authority and the [employer] Act, this Virtual  Visit was conducted, with patient's consent, to reduce the patient's risk of exposure to COVID-19 and provide continuity of care for an established patient. Services were provided through a video synchronous discussion virtually to substitute for in-person clinic visit. Subjective      He is doing well but still occasional cp even if not significant as before   lasting about 1 minute and not necessarily limiting his activities   no radiation no palpitations   he tells me his sob has improved significantly after stents    HPI  Patient with a history of hyperlipidemia, colon cancer status post resection and tobacco abuse who presented to [facility] emergency room on March 27, 2020 with chest pain suggestive of unstable angina. The patient did rule out for myocardial infarction but given the high clinical suspicion for unstable angina he underwent cardiac catheterization as noted below. No past medical history on file. No past surgical history on file. Social History     Tobacco Use    Smoking status: Not on file   Substance Use Topics    Alcohol use: Not on file    Drug use: Not on file                 03/27/20   CARDIAC PROCEDURE 03/27/2020 3/27/2020    Narrative Findings:  1)NSTEMI  2)Low LVEDP  3)Culprit lesions in OM2(90%) and distal LCx(90%) treated with 2.25 by 12   mm and 3.25 by 18 mm Xience FERNANDA, optimized with IVUS guidance using 2.25   and 3.5 NC balloons. 4)Mild to moderate disease in Lad and RCA were deferred for medical mg.     Contrast 110 cc    Access: right radial--no issues    Recommendations:   1)Plavix based DAPT  2)Smoking cessation  3)HIgh intensity statins and GDMT for secondary prevention. Signed by: Ayan Peres MD           Visit Vitals  /77 (BP 1 Location: Left arm, BP Patient Position: Sitting)   Ht 5' 6\" (1.676 m)   Wt 189 lb 6.4 oz (85.9 kg)   BMI 30.57 kg/m²         Wt Readings from Last 3 Encounters:   04/14/20 189 lb 6.4 oz (85.9 kg)   03/27/20 190 lb (86.2 kg)           Review of Symptoms  11 systems reviewed, negative other than as stated in the HPI    Physical Exam:    Due to this being a TeleHealth evaluation, many elements of the physical examination are unable to be assessed. General: Well developed, in no acute distress, cooperative and alert  HEENT: Pupils equal/round. No marked JVD visible on video. Respiratory: No audible wheezing, no signs of respiratory distress, lips non cyanotic  Extremities:  No edema  Neuro: A&Ox3, speech clear, no facial droop, answering questions appropriately  Skin: Skin color is normal. No rashes or lesions. Non diaphoretic on visible skin during exam          Current Outpatient Medications on File Prior to Visit   Medication Sig Dispense Refill    mometasone-formoterol (Dulera) 100-5 mcg/actuation HFA inhaler Take 2 Puffs by inhalation two (2) times a day.  omeprazole (PRILOSEC) 40 mg capsule Take 40 mg by mouth two (2) times a day.  aspirin (ASPIRIN) 325 mg tablet Take 1 Tab by mouth daily. 90 Tab 5    clopidogreL (PLAVIX) 75 mg tab Take 1 Tab by mouth daily. 30 Tab 5    atorvastatin (LIPITOR) 80 mg tablet Take 1 Tab by mouth daily. 30 Tab 5     No current facility-administered medications on file prior to visit.              Lab Results   Component Value Date/Time    Cholesterol, total 217 (H) 03/27/2020 06:59 AM    HDL Cholesterol 29 03/27/2020 06:59 AM    LDL, calculated 122.8 (H) 03/27/2020 06:59 AM    VLDL, calculated 65.2 03/27/2020 06:59 AM    Triglyceride 326 (H) 03/27/2020 06:59 AM    CHOL/HDL Ratio 7.5 (H) 03/27/2020 06:59 AM         Lab Results   Component Value Date/Time    Sodium 137 03/27/2020 06:59 AM    Potassium 4.0 03/27/2020 06:59 AM    Chloride 107 03/27/2020 06:59 AM    CO2 24 03/27/2020 06:59 AM    Anion gap 6 03/27/2020 06:59 AM    Glucose 99 03/27/2020 06:59 AM    BUN 14 03/27/2020 06:59 AM    Creatinine 1.01 03/27/2020 06:59 AM    BUN/Creatinine ratio 14 03/27/2020 06:59 AM    GFR est AA >60 03/27/2020 06:59 AM    GFR est non-AA >60 03/27/2020 06:59 AM    Calcium 8.8 03/27/2020 06:59 AM         Lab Results   Component Value Date/Time    ALT (SGPT) 22 03/27/2020 06:59 AM    AST (SGOT) 16 03/27/2020 06:59 AM    Alk. phosphatase 66 03/27/2020 06:59 AM    Bilirubin, total 0.6 03/27/2020 06:59 AM         Lab Results   Component Value Date/Time    WBC 11.0 03/27/2020 06:59 AM    HGB 16.7 03/27/2020 06:59 AM    HCT 48.8 03/27/2020 06:59 AM    PLATELET 527 46/57/4235 06:59 AM    MCV 89.1 03/27/2020 06:59 AM             Assessment  /Plan  :  1.  CAD: He has some recurrent chest discomfort with change some ways is atypical in other ways typical for angina. Clearly the chest pain is much shorter and not necessary limit his activities like the previous chest pain. We have discussed the presence of residual coronary artery disease in the LAD with 40% stenosis in the RCA with a 50% stenosis. He is status post PCI of obtuse marginal and circumflex artery in March 2020. Continue aspirin Plavix and high potency statin. Possibility of vasospasm is been discussed with him. I will start a trial of Imdur 30 mg daily. Side effects have been explained. I have asked the patient to absolutely avoid any erectile dysfunction medication at this point. We have discussed exercise. He will try to walk 150 minutes a week. He does have a very physical job. Given the fact that he continues to have occasional chest discomfort he will continue to be on this the ability until the next office visit.     Given the COVID situation unfortunately stress tests are on hold at this point but I will schedule stress echocardiogram at the beginning of June. 2.  Hyperlipidemia: On the higher dose of Lipitor at this time. Proceed with lipids and liver function tests in June. 3.  Tobacco abuse: Discussed with him the importance of stopping tobacco use. He tells me he has diminished amount of cigarettes but still smoking. 4.  Colon CA: Status post colon resection and closely followed by his primary care physician. See him back in June for follow-up either virtually or in person depending current COVID situation. Discussed disability forms and disability in general.  Disability forms all filled. We discussed the expected course, resolution and complications of the diagnosis(es) in detail. Medication risks, benefits, costs, interactions, and alternatives were discussed as indicated. I advised him   to contact the office if her condition worsens, changes or fails to improve as anticipated. he expressed understanding with the diagnosis(es) and plan        Jacquelyn Coffey MD      Greater than 20 minutes was spent in direct video patient care, planning and chart review. This visit was conducted using [employer] Me telemedicine services.

## 2020-04-14 NOTE — PATIENT INSTRUCTIONS
Start taking imdur 30 mg daily. You will be scheduled for a stress echocardiogram after your appointment today. Please wear comfortable clothing (shorts or pants with a shirt or blouse) and walking/athletic shoes. Do not eat or drink anything, except water, for at least 2 hours prior to your test. 
 
Do  take your scheduled medications prior to your test. 
 
Have fasting labs obtained in June/2020. Follow up with Dr. Jerry Wasserman 1 week after the above.

## 2020-04-15 ENCOUNTER — TELEPHONE (OUTPATIENT)
Dept: CARDIOLOGY CLINIC | Age: 55
End: 2020-04-15

## 2020-04-15 DIAGNOSIS — I25.118 CORONARY ARTERY DISEASE OF NATIVE ARTERY OF NATIVE HEART WITH STABLE ANGINA PECTORIS (HCC): Primary | ICD-10-CM

## 2020-04-16 RX ORDER — NITROGLYCERIN 0.4 MG/1
0.4 TABLET SUBLINGUAL
Qty: 1 BOTTLE | Refills: 4 | Status: ON HOLD | OUTPATIENT
Start: 2020-04-16 | End: 2021-04-23

## 2020-04-16 NOTE — TELEPHONE ENCOUNTER
Request for SL nitro PRN. Last office visit 4-14-20, next office visit 7-7-20. Refills per verbal order from Dr. Laura Herrera.

## 2020-04-30 ENCOUNTER — TELEPHONE (OUTPATIENT)
Dept: CARDIOLOGY CLINIC | Age: 55
End: 2020-04-30

## 2020-04-30 NOTE — TELEPHONE ENCOUNTER
Copy of completed disability form faxed to [facility] of [address] Confirmation received.

## 2020-06-19 ENCOUNTER — TELEPHONE (OUTPATIENT)
Dept: CARDIOLOGY CLINIC | Age: 55
End: 2020-06-19

## 2020-06-19 NOTE — TELEPHONE ENCOUNTER
Spoke with pt re: covid 19 protocol for stress testing. Pt informed he must get COVID test on Monday June 22 between 8am and 12pm prior to stress test on June 25. Advised pt he needed to limit exposure after covid test and avoid crpwded areas until stress test in completed. Pt verbalized understanding.   Order faxed to [phone] per pt request

## 2020-06-22 ENCOUNTER — OFFICE VISIT (OUTPATIENT)
Dept: PRIMARY CARE CLINIC | Age: 55
End: 2020-06-22

## 2020-06-22 DIAGNOSIS — Z01.818 PREOP TESTING: Primary | ICD-10-CM

## 2020-06-22 NOTE — Clinical Note
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 LKW 2:00am 9/22 at a family gathering. History obtained from patients children at bedside, who interpreted for the patient. Patients sister heard her get up and ambulate to the bathroom at 5:00am 9/22, however when family members went to wake her up for temple this morning around 9:00am, they found her unable to speak or move her R side. NIHSS: 19. CT Head showed L MCA acute-subacute infarct. CTA head demonstrated L M2 occlusion. Patient was not a candidate for endovascular treatment given core infarct of 0 with penumbra of 7.

## 2020-06-23 LAB
ALBUMIN SERPL-MCNC: 4.6 G/DL (ref 3.8–4.9)
ALP SERPL-CCNC: 65 IU/L (ref 39–117)
ALT SERPL-CCNC: 19 IU/L (ref 0–44)
AST SERPL-CCNC: 18 IU/L (ref 0–40)
BILIRUB DIRECT SERPL-MCNC: 0.14 MG/DL (ref 0–0.4)
BILIRUB SERPL-MCNC: 0.6 MG/DL (ref 0–1.2)
CHOLEST SERPL-MCNC: 158 MG/DL (ref 100–199)
HDLC SERPL-MCNC: 31 MG/DL
INTERPRETATION, 910389: NORMAL
LDLC SERPL CALC-MCNC: 98 MG/DL (ref 0–99)
PROT SERPL-MCNC: 6.4 G/DL (ref 6–8.5)
TRIGL SERPL-MCNC: 147 MG/DL (ref 0–149)
VLDLC SERPL CALC-MCNC: 29 MG/DL (ref 5–40)

## 2020-06-24 ENCOUNTER — TELEPHONE (OUTPATIENT)
Dept: CARDIOLOGY CLINIC | Age: 55
End: 2020-06-24

## 2020-06-24 LAB — SARS-COV-2, NAA: NOT DETECTED

## 2020-06-24 NOTE — TELEPHONE ENCOUNTER
Left msg covid test results are not back. Test cancelled for 9:00am and I will call him in the am to r/s.

## 2020-06-24 NOTE — TELEPHONE ENCOUNTER
Kari Jonas w/ [facility] stated that the patient wishes to start cardiac rehab with them but they will need an order. She stated that he plans to start on Tuesday. Please advise.     Phone #: [phone]  Fax #: [phone]  Thanks

## 2020-06-25 NOTE — PROGRESS NOTES
You had a NEGATIVE COVID test. You still need to use good handwashing, wear a mask in public indoor places and practice social distancing. If you need anything please contact the office.    Claudis Mcburney, ANP

## 2020-06-29 NOTE — TELEPHONE ENCOUNTER
Austin Mock is calling to follow up on order for patient to start rehab with them tomorrow. She states order was faxed to our office on 6/24/20 and would like to know if she can do anything to have the order faxed back to them by end of day today. Please advise.     She can be be reached at  [phone]

## 2020-06-29 NOTE — TELEPHONE ENCOUNTER
Left message at cardiac rehab office. Signed order for cardiac rehab faxed. Confirmation received.      Future Appointments   Date Time Provider Nic Arechiga   7/20/2020  2:20 PM José Antonio Candelario  [address]

## 2020-07-20 ENCOUNTER — VIRTUAL VISIT (OUTPATIENT)
Dept: CARDIOLOGY CLINIC | Age: 55
End: 2020-07-20

## 2020-07-20 DIAGNOSIS — I21.4 NON-STEMI (NON-ST ELEVATED MYOCARDIAL INFARCTION) (HCC): ICD-10-CM

## 2020-07-20 DIAGNOSIS — E78.49 OTHER HYPERLIPIDEMIA: ICD-10-CM

## 2020-07-20 DIAGNOSIS — I25.10 CORONARY ARTERY DISEASE INVOLVING NATIVE CORONARY ARTERY OF NATIVE HEART WITHOUT ANGINA PECTORIS: Primary | ICD-10-CM

## 2020-07-20 NOTE — PROGRESS NOTES
[facility] Cardiology Telemedicine Encounter                                                         Pursuant to the emergency declaration under the [facility], [facility] waiver authority and the [employer] Act, this Virtual  Visit was conducted, with patient's consent, to reduce the patient's risk of exposure to COVID-19 and provide continuity of care for an established patient. Services were provided through a video synchronous discussion virtually to substitute for in-person clinic visit. Subjective    Cp has now resolved  He is doing well with no issues        HPI    Patient with a history of hyperlipidemia, colon cancer status post resection and tobacco abuse who presented to [facility] emergency room on March 27, 2020 with chest pain suggestive of unstable angina.     The patient did rule out for myocardial infarction but given the high clinical suspicion for unstable angina he underwent cardiac catheterization as noted below. Quit smoking on 7/20 06/25/20   ECHO STRESS 07/06/2020 7/8/2020    Narrative · Normal stress echocardiogram. Low risk study. · Baseline ECG: Normal sinus rhythm. · Low risk Duke treadmill score. · Negative stress test.        Signed by: Isabel Badillo MD                03/27/20   CARDIAC PROCEDURE 03/27/2020 3/27/2020    Narrative Findings:  1)NSTEMI  2)Low LVEDP  3)Culprit lesions in OM2(90%) and distal LCx(90%) treated with 2.25 by 12   mm and 3.25 by 18 mm Xience FERNANDA, optimized with IVUS guidance using 2.25   and 3.5 NC balloons. 4)Mild to moderate disease in Lad and RCA were deferred for medical mg. Contrast 110 cc    Access: right radial--no issues    Recommendations:   1)Plavix based DAPT  2)Smoking cessation  3)HIgh intensity statins and GDMT for secondary prevention. Signed by: Zarina Arias MD             No past medical history on file.       No past surgical history on file. Social History     Tobacco Use    Smoking status: Not on file   Substance Use Topics    Alcohol use: Not on file    Drug use: Not on file             There were no vitals taken for this visit. Wt Readings from Last 3 Encounters:   06/25/20 85.7 kg (189 lb)   04/14/20 85.9 kg (189 lb 6.4 oz)   03/27/20 86.2 kg (190 lb)           Review of Symptoms  11 systems reviewed, negative other than as stated in the HPI    Physical Exam:    Due to this being a TeleHealth evaluation, many elements of the physical examination are unable to be assessed. General: Well developed, in no acute distress, cooperative and alert  HEENT: Pupils equal/round. No marked JVD visible on video. Respiratory: No audible wheezing, no signs of respiratory distress, lips non cyanotic  Extremities:  No edema  Neuro: A&Ox3, speech clear, no facial droop, answering questions appropriately  Skin: Skin color is normal. No rashes or lesions. Non diaphoretic on visible skin during exam          Current Outpatient Medications on File Prior to Visit   Medication Sig Dispense Refill    nitroglycerin (NITROSTAT) 0.4 mg SL tablet 1 Tab by SubLINGual route every five (5) minutes as needed for Chest Pain. Do not exceed 3 doses in 24 hours. 1 Bottle 4    mometasone-formoterol (Dulera) 100-5 mcg/actuation HFA inhaler Take 2 Puffs by inhalation two (2) times a day.  aspirin (ASPIRIN) 325 mg tablet Take 1 Tab by mouth daily. 90 Tab 5    clopidogreL (PLAVIX) 75 mg tab Take 1 Tab by mouth daily. 30 Tab 5    atorvastatin (LIPITOR) 80 mg tablet Take 1 Tab by mouth daily. 30 Tab 5     No current facility-administered medications on file prior to visit.              Lab Results   Component Value Date/Time    Cholesterol, total 158 06/22/2020 11:47 AM    HDL Cholesterol 31 (L) 06/22/2020 11:47 AM    LDL, calculated 98 06/22/2020 11:47 AM    VLDL, calculated 29 06/22/2020 11:47 AM    Triglyceride 147 06/22/2020 11:47 AM    CHOL/HDL Ratio 7.5 (H) 03/27/2020 06:59 AM         Lab Results   Component Value Date/Time    Sodium 137 03/27/2020 06:59 AM    Potassium 4.0 03/27/2020 06:59 AM    Chloride 107 03/27/2020 06:59 AM    CO2 24 03/27/2020 06:59 AM    Anion gap 6 03/27/2020 06:59 AM    Glucose 99 03/27/2020 06:59 AM    BUN 14 03/27/2020 06:59 AM    Creatinine 1.01 03/27/2020 06:59 AM    BUN/Creatinine ratio 14 03/27/2020 06:59 AM    GFR est AA >60 03/27/2020 06:59 AM    GFR est non-AA >60 03/27/2020 06:59 AM    Calcium 8.8 03/27/2020 06:59 AM         Lab Results   Component Value Date/Time    ALT (SGPT) 19 06/22/2020 11:47 AM    Alk. phosphatase 65 06/22/2020 11:47 AM    Bilirubin, direct 0.14 06/22/2020 11:47 AM    Bilirubin, total 0.6 06/22/2020 11:47 AM         Lab Results   Component Value Date/Time    WBC 11.0 03/27/2020 06:59 AM    HGB 16.7 03/27/2020 06:59 AM    HCT 48.8 03/27/2020 06:59 AM    PLATELET 940 55/99/6154 06:59 AM    MCV 89.1 03/27/2020 06:59 AM             Assessment  /Plan  :    1.  CAD: no recurrent cp, discussed results of stress echo of 6/20 which has failed to reveal recurrent ischemia     We have discussed the presence of residual coronary artery disease in the LAD with 40% stenosis in the RCA with a 50% stenosis. He will let me know if again recurrent cp     He is status post PCI of obtuse marginal and circumflex artery in March 2020.     Continue aspirin Plavix and high potency statin. He is no longer taking imdur he tells me        We have discussed exercise. He will try to walk 150 minutes a week.       2. Hyperlipidemia: On the higher dose of Lipitor at this time. ldl still >70 (98)  Discussed options  Stop lipitor start crestor 40 mg daily  Lipids and lft in 3 months    Discussed potential side effects he will let me know if any     3. Tobacco abuse: quit smoking in 7/20       4.   Colon CA: Status post colon resection and closely followed by his primary care physician.     We have discussed use of cyalis and need to avoid any form of ntg  We have discussed potential knee surgery but it would be preferable to wait at least 6 months from the time of stent  He will let me know    Otherwise see him back in 6 months              We discussed the expected course, resolution and complications of the diagnosis(es) in detail. Medication risks, benefits, costs, interactions, and alternatives were discussed as indicated. I advised him   to contact the office if her condition worsens, changes or fails to improve as anticipated. he expressed understanding with the diagnosis(es) and plan        Felisha Rivera MD      Greater than 20 minutes was spent in direct video patient care, planning and chart review. This visit was conducted using [employer] telemedicine services.

## 2020-07-21 ENCOUNTER — TELEPHONE (OUTPATIENT)
Dept: CARDIOLOGY CLINIC | Age: 55
End: 2020-07-21

## 2020-07-21 RX ORDER — ROSUVASTATIN CALCIUM 40 MG/1
40 TABLET, COATED ORAL
Qty: 90 TAB | Refills: 1 | Status: SHIPPED | OUTPATIENT
Start: 2020-07-21 | End: 2021-01-19

## 2020-07-21 NOTE — PATIENT INSTRUCTIONS
Stop taking lipitor. Start taking crestor 40 mg daily. Have fasting labs obtained in 3 months. Our office will call you with results. Follow up with Dr. Mickie Boles in 6 months.

## 2020-08-24 RX ORDER — CLOPIDOGREL BISULFATE 75 MG/1
75 TABLET ORAL DAILY
Qty: 90 TAB | Refills: 1 | Status: SHIPPED | OUTPATIENT
Start: 2020-08-24 | End: 2021-02-22

## 2020-08-24 NOTE — ADDENDUM NOTE
Addended by: Isadore Meigs on: 4/14/2020 11:11 AM     Modules accepted: Orders
Addended by: Micah Guzmán on: 4/14/2020 11:23 AM     Modules accepted: Orders
108.9

## 2020-08-24 NOTE — TELEPHONE ENCOUNTER
Requested Prescriptions     Signed Prescriptions Disp Refills    clopidogreL (PLAVIX) 75 mg tab 90 Tab 1     Sig: Take 1 Tab by mouth daily.      Authorizing Provider: Diego Villalobos     Ordering User: Julia Li     Verbal order per Dr. Coats,    Future Appointments   Date Time Provider Nic Geni   1/12/2021 11:00 AM MD QUAN Paulino AMB

## 2020-09-28 RX ORDER — ISOSORBIDE MONONITRATE 30 MG/1
TABLET, EXTENDED RELEASE ORAL
Qty: 90 TAB | Refills: 1 | OUTPATIENT
Start: 2020-09-28

## 2020-10-20 LAB
ALBUMIN SERPL-MCNC: 4.9 G/DL (ref 3.8–4.9)
ALP SERPL-CCNC: 62 IU/L (ref 39–117)
ALT SERPL-CCNC: 16 IU/L (ref 0–44)
AST SERPL-CCNC: 19 IU/L (ref 0–40)
BILIRUB DIRECT SERPL-MCNC: 0.13 MG/DL (ref 0–0.4)
BILIRUB SERPL-MCNC: 0.4 MG/DL (ref 0–1.2)
CHOLEST SERPL-MCNC: 149 MG/DL (ref 100–199)
HDLC SERPL-MCNC: 33 MG/DL
INTERPRETATION, 910389: NORMAL
LDLC SERPL CALC-MCNC: 84 MG/DL (ref 0–99)
PROT SERPL-MCNC: 6.9 G/DL (ref 6–8.5)
TRIGL SERPL-MCNC: 189 MG/DL (ref 0–149)
VLDLC SERPL CALC-MCNC: 32 MG/DL (ref 5–40)

## 2020-10-23 ENCOUNTER — OFFICE VISIT (OUTPATIENT)
Dept: CARDIOLOGY CLINIC | Age: 55
End: 2020-10-23
Payer: COMMERCIAL

## 2020-10-23 VITALS
HEART RATE: 76 BPM | BODY MASS INDEX: 30.22 KG/M2 | HEIGHT: 66 IN | OXYGEN SATURATION: 96 % | SYSTOLIC BLOOD PRESSURE: 118 MMHG | RESPIRATION RATE: 18 BRPM | DIASTOLIC BLOOD PRESSURE: 84 MMHG | WEIGHT: 188 LBS

## 2020-10-23 DIAGNOSIS — E78.49 OTHER HYPERLIPIDEMIA: ICD-10-CM

## 2020-10-23 DIAGNOSIS — I25.10 CORONARY ARTERY DISEASE INVOLVING NATIVE CORONARY ARTERY OF NATIVE HEART WITHOUT ANGINA PECTORIS: ICD-10-CM

## 2020-10-23 DIAGNOSIS — I25.10 CORONARY ARTERY DISEASE INVOLVING NATIVE CORONARY ARTERY OF NATIVE HEART WITHOUT ANGINA PECTORIS: Primary | ICD-10-CM

## 2020-10-23 PROCEDURE — 99215 OFFICE O/P EST HI 40 MIN: CPT | Performed by: SPECIALIST

## 2020-10-23 PROCEDURE — 93000 ELECTROCARDIOGRAM COMPLETE: CPT | Performed by: SPECIALIST

## 2020-10-23 NOTE — PROGRESS NOTES
Dr. Celestino Diop sent note to Dr. Maddy Hoffman office regarding cardiac clearance for right total knee replacement on 11-10-20.

## 2020-10-23 NOTE — PROGRESS NOTES
[facility]                                                            OFFICE NOTE        Irina Guzmán M.D.,REJI            Yung PERES   1965  [id number]    Date/Time:  10/23/640508:48 AM      SUBJECTIVE:  He is doing great cardiac wise   no cp or sob or palpitations and he feels a lot better than before his pci    Only complaint is severe right knee pain which limits his activities       Assessment/Plan  1.  CAD: no recurrent cp, discussed results of stress echo of 6/20 which has failed to reveal recurrent ischemia    He is completely asymptomatic    His EKG shows NSR and no significant st t changes     We have discussed the presence of residual coronary artery disease in the LAD with 40% stenosis in the RCA with a 50% stenosis.     He will let me know if again recurrent cp     He is status post PCI of obtuse marginal and circumflex artery in March 2020.     Continue aspirin Plavix and high potency statin.     He is no longer taking imdur      We have discussed exercise.  He will try to walk 150 minutes a week.        2.  Hyperlipidemia:  now on crestor, no side effects but ldl still >70 although better at 84    Discussed options, he wants to wait for zetia, start more exercise after tkr and weight loss and recheck in 6 months , if still elevated start zetia'     3.  Tobacco abuse: quit smoking in 7/20     4.  Colon CA: Status post colon resection and closely followed by his primary care physician.     We have discussed use of cyalis and need to avoid any form of ntg    5. Preop: The patient is completely asymptomatic cardiac wise. His electrocardiogram reveals normal sinus rhythm without significant ST-T changes. At this time the patient is at an acceptable cardiac risk to proceed with right total knee replacement as planned. May stop Plavix 5 to 7days prior to surgery.     I have asked him to decrease his aspirin after the surgery to 162 mg daily and resume Plavix. I would like to continue Plavix for a total of 1 year. 6.HTN: elevated today but very nervous and he tells me completely normal at home <120 at all times  He will continue to monitor     Otherwise see him back in 6 monthswith lipids           HPI   Patient with a history of hyperlipidemia, colon cancer status post resection and tobacco abuse who presented to [facility] emergency room on March 27, 2020 with chest pain suggestive of unstable angina.     The patient did rule out for myocardial infarction but given the high clinical suspicion for unstable angina he underwent cardiac catheterization as noted below. S/p PCI of OM and CX on 3/20 residual 40% and 20% in LAD and 50% in RCA     Quit smoking on 7/20                         CARDIAC STUDIES        06/25/20   ECHO STRESS 07/06/2020 7/8/2020    Narrative · Normal stress echocardiogram. Low risk study. · Baseline ECG: Normal sinus rhythm. · Low risk Duke treadmill score. · Negative stress test.        Signed by: Chris Claire MD                        03/27/20   CARDIAC PROCEDURE 03/27/2020 3/27/2020    Narrative Findings:  1)NSTEMI  2)Low LVEDP  3)Culprit lesions in OM2(90%) and distal LCx(90%) treated with 2.25 by 12   mm and 3.25 by 18 mm Xience FERNANDA, optimized with IVUS guidance using 2.25   and 3.5 NC balloons. 4)Mild to moderate disease in Lad and RCA were deferred for medical mg. Contrast 110 cc    Access: right radial--no issues    Recommendations:   1)Plavix based DAPT  2)Smoking cessation  3)HIgh intensity statins and GDMT for secondary prevention. Signed by: Daria Camarena MD           EKG Results     None              IMAGING      MRI Results (most recent):  No results found for this or any previous visit. CT Results (most recent):  No results found for this or any previous visit.     XR Results (most recent):  Results from [facility] Encounter encounter on 03/27/20   XR CHEST PA LAT    Narrative Clinical history: eval for chest pain  INDICATION:   eval for chest pain  COMPARISON: None    FINDINGS:   PA and lateral views of the chest are obtained. The cardiopericardial silhouette is within normal limits. There is no pleural  effusion, pneumothorax or focal consolidation present. Impression IMPRESSION: No acute intrathoracic disease. No past medical history on file. No past surgical history on file. Social History     Tobacco Use    Smoking status: Not on file   Substance Use Topics    Alcohol use: Not on file    Drug use: Not on file     No family history on file. No Known Allergies      There were no vitals taken for this visit. There were no vitals filed for this visit. Review of Systems:   Pertinent items are noted in the History of Present Illness. Visit Vitals  BP (!) 130/90 (BP 1 Location: Left arm, BP Patient Position: Sitting)   Pulse 76   Resp 18   Ht 5' 6\" (1.676 m)   Wt 188 lb (85.3 kg)   SpO2 96%   BMI 30.34 kg/m²     General Appearance:  Well developed, well nourished,alert and oriented x 3, and individual in no acute distress. Ears/Nose/Mouth/Throat:   Hearing grossly normal.         Neck: Supple. Chest:   Lungs clear to auscultation bilaterally. Cardiovascular:  Regular rate and rhythm, S1, S2 normal, no murmur. Abdomen:   Soft, non-tender, bowel sounds are active. Extremities: No edema bilaterally. Skin: Warm and dry. Current Outpatient Medications on File Prior to Visit   Medication Sig Dispense Refill    clopidogreL (PLAVIX) 75 mg tab Take 1 Tab by mouth daily. 90 Tab 1    rosuvastatin (CRESTOR) 40 mg tablet Take 1 Tab by mouth nightly. 90 Tab 1    nitroglycerin (NITROSTAT) 0.4 mg SL tablet 1 Tab by SubLINGual route every five (5) minutes as needed for Chest Pain. Do not exceed 3 doses in 24 hours.  1 Bottle 4    mometasone-formoterol (Dulera) 100-5 mcg/actuation HFA inhaler Take 2 Puffs by inhalation two (2) times a day.  aspirin (ASPIRIN) 325 mg tablet Take 1 Tab by mouth daily. 90 Tab 5     No current facility-administered medications on file prior to visit. Benita Cuadra Loly Blood had no medications administered during this visit. Current Outpatient Medications   Medication Sig    clopidogreL (PLAVIX) 75 mg tab Take 1 Tab by mouth daily.  rosuvastatin (CRESTOR) 40 mg tablet Take 1 Tab by mouth nightly.  nitroglycerin (NITROSTAT) 0.4 mg SL tablet 1 Tab by SubLINGual route every five (5) minutes as needed for Chest Pain. Do not exceed 3 doses in 24 hours.  mometasone-formoterol (Dulera) 100-5 mcg/actuation HFA inhaler Take 2 Puffs by inhalation two (2) times a day.  aspirin (ASPIRIN) 325 mg tablet Take 1 Tab by mouth daily. No current facility-administered medications for this visit. Lab Results   Component Value Date/Time    Cholesterol, total 149 10/19/2020 08:56 AM    HDL Cholesterol 33 (L) 10/19/2020 08:56 AM    LDL, calculated 98 06/22/2020 11:47 AM    LDL Chol Calc (NIH) 84 10/19/2020 08:56 AM    VLDL, calculated 29 06/22/2020 11:47 AM    VLDL Cholesterol David 32 10/19/2020 08:56 AM    Triglyceride 189 (H) 10/19/2020 08:56 AM    CHOL/HDL Ratio 7.5 (H) 03/27/2020 06:59 AM       Lab Results   Component Value Date/Time    Sodium 137 03/27/2020 06:59 AM    Potassium 4.0 03/27/2020 06:59 AM    Chloride 107 03/27/2020 06:59 AM    CO2 24 03/27/2020 06:59 AM    Anion gap 6 03/27/2020 06:59 AM    Glucose 99 03/27/2020 06:59 AM    BUN 14 03/27/2020 06:59 AM    Creatinine 1.01 03/27/2020 06:59 AM    BUN/Creatinine ratio 14 03/27/2020 06:59 AM    GFR est AA >60 03/27/2020 06:59 AM    GFR est non-AA >60 03/27/2020 06:59 AM    Calcium 8.8 03/27/2020 06:59 AM       Lab Results   Component Value Date/Time    ALT (SGPT) 16 10/19/2020 08:56 AM    Alk.  phosphatase 62 10/19/2020 08:56 AM    Bilirubin, direct 0.13 10/19/2020 08:56 AM    Bilirubin, total 0.4 10/19/2020 08:56 AM       Lab Results   Component Value Date/Time    WBC 11.0 03/27/2020 06:59 AM    HGB 16.7 03/27/2020 06:59 AM    HCT 48.8 03/27/2020 06:59 AM    PLATELET 186 19/86/8570 06:59 AM    MCV 89.1 03/27/2020 06:59 AM       No results found for: TSH, TSH2, TSH3, TSHP, TSHEXT      Lab Results   Component Value Date/Time    Cholesterol, total 149 10/19/2020 08:56 AM    Cholesterol, total 158 06/22/2020 11:47 AM    Cholesterol, total 217 (H) 03/27/2020 06:59 AM    HDL Cholesterol 33 (L) 10/19/2020 08:56 AM    HDL Cholesterol 31 (L) 06/22/2020 11:47 AM    HDL Cholesterol 29 03/27/2020 06:59 AM    LDL, calculated 98 06/22/2020 11:47 AM    LDL, calculated 122.8 (H) 03/27/2020 06:59 AM    LDL Chol Calc (NIH) 84 10/19/2020 08:56 AM    Triglyceride 189 (H) 10/19/2020 08:56 AM    Triglyceride 147 06/22/2020 11:47 AM    Triglyceride 326 (H) 03/27/2020 06:59 AM    CHOL/HDL Ratio 7.5 (H) 03/27/2020 06:59 AM                Please note that this dictation was completed with [employer], the [employer] voice recognition software. Quite often unanticipated grammatical, syntax, homophones, and other interpretative errors are inadvertently transcribed by the computer software. Please disregard these errors. Please excuse any errors that have escaped final proofreading.

## 2020-10-23 NOTE — PROGRESS NOTES
Visit Vitals  BP (!) 130/90 (BP 1 Location: Left arm, BP Patient Position: Sitting)   Pulse 76   Resp 18   Ht 5' 6\" (1.676 m)   Wt 188 lb (85.3 kg)   SpO2 96%   BMI 30.34 kg/m²

## 2020-10-23 NOTE — PATIENT INSTRUCTIONS
Schedule follow up with Dr. Paola Kendall in 6 months. Have fasting labs obtained, 1 week prior to follow up.

## 2021-01-19 RX ORDER — ROSUVASTATIN CALCIUM 40 MG/1
TABLET, COATED ORAL
Qty: 90 TAB | Refills: 1 | Status: SHIPPED | OUTPATIENT
Start: 2021-01-19 | End: 2021-08-16 | Stop reason: SDUPTHER

## 2021-01-19 NOTE — TELEPHONE ENCOUNTER
Cardiologist: Dr. Gianfranco Hall     Last appt: 7/20/2020  Future Appointments   Date Time Provider Nic Lundbergi   4/23/2021 11:40 AM MD QUAN June BS AMB       Requested Prescriptions     Signed Prescriptions Disp Refills    rosuvastatin (CRESTOR) 40 mg tablet 90 Tab 1     Sig: TAKE 1 TABLET BY MOUTH EVERY NIGHT     Authorizing Provider: Javan Valencia     Ordering User: ROLAND MOSQUERA         Refsylvia VO per Dr. Gianfranco Hall.

## 2021-02-22 RX ORDER — CLOPIDOGREL BISULFATE 75 MG/1
TABLET ORAL
Qty: 90 TAB | Refills: 1 | Status: SHIPPED | OUTPATIENT
Start: 2021-02-22 | End: 2021-03-30

## 2021-02-22 NOTE — TELEPHONE ENCOUNTER
Cardiologist: Dr. Laina Yost    Last appt: 10/23/2020  Future Appointments   Date Time Provider Nic Geni   4/23/2021 11:40 AM MD QUAN Miramontes BS AMB       Requested Prescriptions     Signed Prescriptions Disp Refills    clopidogreL (PLAVIX) 75 mg tab 90 Tab 1     Sig: TAKE 1 TABLET BY MOUTH DAILY     Authorizing Provider: Quyen Monsivais     Ordering User: ROLAND MOSQUERA         Refsylvia VO per Dr. Laina Yost.

## 2021-03-30 RX ORDER — CLOPIDOGREL BISULFATE 75 MG/1
TABLET ORAL
Qty: 90 TAB | Refills: 1 | Status: SHIPPED | OUTPATIENT
Start: 2021-03-30

## 2021-03-30 NOTE — TELEPHONE ENCOUNTER
Cardiologist: Dr. Laina Yost    Last appt: 10/23/2020  Future Appointments   Date Time Provider Nic Geni   4/23/2021 11:40 AM MD QUAN Miramontes BS AMB       Requested Prescriptions     Signed Prescriptions Disp Refills    clopidogreL (PLAVIX) 75 mg tab 90 Tab 1     Sig: TAKE 1 TABLET BY MOUTH EVERY DAY     Authorizing Provider: Quyen Monsivais     Ordering User: ROLAND MOSQUERA         Refsylvia VO per Dr. Laina Yost.

## 2021-04-21 LAB
ALBUMIN SERPL-MCNC: 4.8 G/DL (ref 3.8–4.9)
ALP SERPL-CCNC: 73 IU/L (ref 39–117)
ALT SERPL-CCNC: 12 IU/L (ref 0–44)
AST SERPL-CCNC: 22 IU/L (ref 0–40)
BILIRUB DIRECT SERPL-MCNC: 0.11 MG/DL (ref 0–0.4)
BILIRUB SERPL-MCNC: 0.4 MG/DL (ref 0–1.2)
CHOLEST SERPL-MCNC: 136 MG/DL (ref 100–199)
HDLC SERPL-MCNC: 34 MG/DL
IMP & REVIEW OF LAB RESULTS: NORMAL
LDLC SERPL CALC-MCNC: 65 MG/DL (ref 0–99)
PROT SERPL-MCNC: 6.5 G/DL (ref 6–8.5)
TRIGL SERPL-MCNC: 223 MG/DL (ref 0–149)
VLDLC SERPL CALC-MCNC: 37 MG/DL (ref 5–40)

## 2021-04-23 ENCOUNTER — HOSPITAL ENCOUNTER (OUTPATIENT)
Age: 56
Setting detail: OBSERVATION
Discharge: HOME | End: 2021-04-23
Attending: EMERGENCY MEDICINE | Admitting: INTERNAL MEDICINE
Payer: COMMERCIAL

## 2021-04-23 ENCOUNTER — APPOINTMENT (OUTPATIENT)
Dept: GENERAL RADIOLOGY | Age: 56
End: 2021-04-23
Attending: EMERGENCY MEDICINE
Payer: COMMERCIAL

## 2021-04-23 VITALS
TEMPERATURE: 98.6 F | OXYGEN SATURATION: 93 % | SYSTOLIC BLOOD PRESSURE: 108 MMHG | DIASTOLIC BLOOD PRESSURE: 98 MMHG | HEIGHT: 66 IN | WEIGHT: 188 LBS | BODY MASS INDEX: 30.22 KG/M2 | RESPIRATION RATE: 15 BRPM | HEART RATE: 89 BPM

## 2021-04-23 DIAGNOSIS — I21.4 NON-STEMI (NON-ST ELEVATED MYOCARDIAL INFARCTION) (HCC): ICD-10-CM

## 2021-04-23 DIAGNOSIS — I20.0 UNSTABLE ANGINA (HCC): Primary | ICD-10-CM

## 2021-04-23 PROBLEM — Z98.890 S/P CARDIAC CATH: Status: ACTIVE | Noted: 2021-04-23

## 2021-04-23 LAB
ALBUMIN SERPL-MCNC: 4.2 G/DL (ref 3.5–5)
ALBUMIN/GLOB SERPL: 1.3 {RATIO} (ref 1.1–2.2)
ALP SERPL-CCNC: 69 U/L (ref 45–117)
ALT SERPL-CCNC: 18 U/L (ref 12–78)
ANION GAP SERPL CALC-SCNC: 7 MMOL/L (ref 5–15)
AST SERPL-CCNC: 13 U/L (ref 15–37)
ATRIAL RATE: 94 BPM
BASOPHILS # BLD: 0.1 K/UL (ref 0–0.1)
BASOPHILS NFR BLD: 1 % (ref 0–1)
BILIRUB SERPL-MCNC: 0.4 MG/DL (ref 0.2–1)
BUN SERPL-MCNC: 9 MG/DL (ref 6–20)
BUN/CREAT SERPL: 10 (ref 12–20)
CALCIUM SERPL-MCNC: 9.4 MG/DL (ref 8.5–10.1)
CALCULATED P AXIS, ECG09: 39 DEGREES
CALCULATED R AXIS, ECG10: 108 DEGREES
CALCULATED T AXIS, ECG11: 49 DEGREES
CHLORIDE SERPL-SCNC: 106 MMOL/L (ref 97–108)
CO2 SERPL-SCNC: 24 MMOL/L (ref 21–32)
COMMENT, HOLDF: NORMAL
CREAT SERPL-MCNC: 0.91 MG/DL (ref 0.7–1.3)
DIAGNOSIS, 93000: NORMAL
DIFFERENTIAL METHOD BLD: ABNORMAL
EOSINOPHIL # BLD: 0.5 K/UL (ref 0–0.4)
EOSINOPHIL NFR BLD: 6 % (ref 0–7)
ERYTHROCYTE [DISTWIDTH] IN BLOOD BY AUTOMATED COUNT: 12.7 % (ref 11.5–14.5)
GLOBULIN SER CALC-MCNC: 3.3 G/DL (ref 2–4)
GLUCOSE SERPL-MCNC: 103 MG/DL (ref 65–100)
HCT VFR BLD AUTO: 51 % (ref 36.6–50.3)
HGB BLD-MCNC: 17.7 G/DL (ref 12.1–17)
IMM GRANULOCYTES # BLD AUTO: 0 K/UL (ref 0–0.04)
IMM GRANULOCYTES NFR BLD AUTO: 0 % (ref 0–0.5)
LYMPHOCYTES # BLD: 1.7 K/UL (ref 0.8–3.5)
LYMPHOCYTES NFR BLD: 20 % (ref 12–49)
MCH RBC QN AUTO: 30.8 PG (ref 26–34)
MCHC RBC AUTO-ENTMCNC: 34.7 G/DL (ref 30–36.5)
MCV RBC AUTO: 88.7 FL (ref 80–99)
MONOCYTES # BLD: 1 K/UL (ref 0–1)
MONOCYTES NFR BLD: 12 % (ref 5–13)
NEUTS SEG # BLD: 5.5 K/UL (ref 1.8–8)
NEUTS SEG NFR BLD: 61 % (ref 32–75)
NRBC # BLD: 0 K/UL (ref 0–0.01)
NRBC BLD-RTO: 0 PER 100 WBC
P-R INTERVAL, ECG05: 152 MS
PLATELET # BLD AUTO: 237 K/UL (ref 150–400)
PMV BLD AUTO: 9.6 FL (ref 8.9–12.9)
POTASSIUM SERPL-SCNC: 4 MMOL/L (ref 3.5–5.1)
PROT SERPL-MCNC: 7.5 G/DL (ref 6.4–8.2)
Q-T INTERVAL, ECG07: 352 MS
QRS DURATION, ECG06: 92 MS
QTC CALCULATION (BEZET), ECG08: 440 MS
RBC # BLD AUTO: 5.75 M/UL (ref 4.1–5.7)
SAMPLES BEING HELD,HOLD: NORMAL
SODIUM SERPL-SCNC: 137 MMOL/L (ref 136–145)
TROPONIN I SERPL-MCNC: <0.05 NG/ML
TROPONIN I SERPL-MCNC: <0.05 NG/ML
UR CULT HOLD, URHOLD: NORMAL
VENTRICULAR RATE, ECG03: 94 BPM
WBC # BLD AUTO: 8.8 K/UL (ref 4.1–11.1)

## 2021-04-23 PROCEDURE — 99218 HC RM OBSERVATION: CPT

## 2021-04-23 PROCEDURE — 99285 EMERGENCY DEPT VISIT HI MDM: CPT

## 2021-04-23 PROCEDURE — 74011250636 HC RX REV CODE- 250/636: Performed by: INTERNAL MEDICINE

## 2021-04-23 PROCEDURE — 99152 MOD SED SAME PHYS/QHP 5/>YRS: CPT | Performed by: INTERNAL MEDICINE

## 2021-04-23 PROCEDURE — 77030010221 HC SPLNT WR POS TELE -B: Performed by: INTERNAL MEDICINE

## 2021-04-23 PROCEDURE — 93458 L HRT ARTERY/VENTRICLE ANGIO: CPT | Performed by: INTERNAL MEDICINE

## 2021-04-23 PROCEDURE — 80053 COMPREHEN METABOLIC PANEL: CPT

## 2021-04-23 PROCEDURE — 71046 X-RAY EXAM CHEST 2 VIEWS: CPT

## 2021-04-23 PROCEDURE — 77030019569 HC BND COMPR RAD TERU -B: Performed by: INTERNAL MEDICINE

## 2021-04-23 PROCEDURE — 99220 PR INITIAL OBSERVATION CARE/DAY 70 MINUTES: CPT | Performed by: INTERNAL MEDICINE

## 2021-04-23 PROCEDURE — 85025 COMPLETE CBC W/AUTO DIFF WBC: CPT

## 2021-04-23 PROCEDURE — 74011000250 HC RX REV CODE- 250: Performed by: INTERNAL MEDICINE

## 2021-04-23 PROCEDURE — 99153 MOD SED SAME PHYS/QHP EA: CPT | Performed by: INTERNAL MEDICINE

## 2021-04-23 PROCEDURE — 74011250636 HC RX REV CODE- 250/636

## 2021-04-23 PROCEDURE — 93005 ELECTROCARDIOGRAM TRACING: CPT

## 2021-04-23 PROCEDURE — 84484 ASSAY OF TROPONIN QUANT: CPT

## 2021-04-23 PROCEDURE — C1894 INTRO/SHEATH, NON-LASER: HCPCS | Performed by: INTERNAL MEDICINE

## 2021-04-23 PROCEDURE — 74011000636 HC RX REV CODE- 636: Performed by: INTERNAL MEDICINE

## 2021-04-23 PROCEDURE — 36415 COLL VENOUS BLD VENIPUNCTURE: CPT

## 2021-04-23 PROCEDURE — 77030013744: Performed by: INTERNAL MEDICINE

## 2021-04-23 PROCEDURE — 77030004532 HC CATH ANGI DX IMP BSC -A: Performed by: INTERNAL MEDICINE

## 2021-04-23 RX ORDER — SODIUM CHLORIDE 9 MG/ML
INJECTION, SOLUTION INTRAVENOUS
Status: COMPLETED | OUTPATIENT
Start: 2021-04-23 | End: 2021-04-23

## 2021-04-23 RX ORDER — BETIATIDE 1 MG/1
INJECTION, POWDER, LYOPHILIZED, FOR SOLUTION INTRAVENOUS
Status: CANCELLED | OUTPATIENT
Start: 2021-04-23

## 2021-04-23 RX ORDER — ALBUTEROL SULFATE 90 UG/1
2 AEROSOL, METERED RESPIRATORY (INHALATION)
COMMUNITY

## 2021-04-23 RX ORDER — FENTANYL CITRATE 50 UG/ML
INJECTION, SOLUTION INTRAMUSCULAR; INTRAVENOUS AS NEEDED
Status: DISCONTINUED | OUTPATIENT
Start: 2021-04-23 | End: 2021-04-23 | Stop reason: HOSPADM

## 2021-04-23 RX ORDER — TRAMADOL HYDROCHLORIDE 50 MG/1
150 TABLET ORAL
Status: ON HOLD | COMMUNITY
End: 2021-04-23

## 2021-04-23 RX ORDER — ASPIRIN 81 MG/1
81 TABLET ORAL 2 TIMES DAILY
COMMUNITY

## 2021-04-23 RX ORDER — LIDOCAINE HYDROCHLORIDE 10 MG/ML
INJECTION INFILTRATION; PERINEURAL AS NEEDED
Status: DISCONTINUED | OUTPATIENT
Start: 2021-04-23 | End: 2021-04-23 | Stop reason: HOSPADM

## 2021-04-23 RX ORDER — TRAZODONE HYDROCHLORIDE 150 MG/1
150 TABLET ORAL
COMMUNITY

## 2021-04-23 RX ORDER — MIDAZOLAM HYDROCHLORIDE 1 MG/ML
INJECTION, SOLUTION INTRAMUSCULAR; INTRAVENOUS AS NEEDED
Status: DISCONTINUED | OUTPATIENT
Start: 2021-04-23 | End: 2021-04-23 | Stop reason: HOSPADM

## 2021-04-23 RX ORDER — HEPARIN SODIUM 1000 [USP'U]/ML
INJECTION, SOLUTION INTRAVENOUS; SUBCUTANEOUS AS NEEDED
Status: DISCONTINUED | OUTPATIENT
Start: 2021-04-23 | End: 2021-04-23 | Stop reason: HOSPADM

## 2021-04-23 NOTE — PROGRESS NOTES
Admission Medication Reconciliation:        Spoke with wife Katy Fitch by telephone @ [phone], unable to speak with patient face to face at this time due to general isolation precautions in the ED related to COVID-19 pandemic. Wife is a reliable historian. RX query is available at this time. Interview included questions regarding use of:  PTA medications including prescription/OTC, vitamins, supplements, inhaled, topical, injectable, otic and ophthalmic medications  Alcohol, nicotine products, THC and related compounds, illicit drugs, stimulant use (i.e., Red Bull), agents used to assist with sleep and/or pain control issues, and whether patient uses other people's medications of any kind    Notes:  Covid vaccine (Moderna) second dose administered 3/23/21. \"Still wears mask faithfully. \"  Smokes: wife did not quantify (she was driving to hospital from [place] during discussion)  Caffeine: \"loves his coffee\"--drinks all day long    Recommendations:  Recommend nicotine patch for basal control once we know daily usage,  and add nicotine lozenge on demand for control of nicotine cravings. Medication changes (since last review): Added:  Aspirin  Trazodone  Albuterol inhaler: \"he doesn't have to use very often\"    Thank you for allowing me to participate in the care of your patient. Ton Boles PharmD, RN # [phone]       [facility] pharmacy benefit data reflects medications filled and processed through the patient's insurance, however   this data does NOT capture whether the medication was picked up or is currently being taken by the patient. Allergies:  Patient has no known allergies.     Significant PMH/Disease States:   Past Medical History:   Diagnosis Date    Asthma     CAD (coronary artery disease)     Colon cancer [facility])     colon     Chief Complaint for this Admission:    Chief Complaint   Patient presents with    Chest Pain    Shortness of Breath     Prior to Admission Medications: Facility-Administered Medications: None     Please contact the main inpatient pharmacy with any questions or concerns at [phone] and we will direct you to the clinical pharmacist covering this patient's care while in-house.    DEBBIE Miller

## 2021-04-23 NOTE — PROGRESS NOTES
TRANSFER - OUT REPORT:    Verbal report given to Will, RT (R) on [place] being transferred to [facility] for routine progression of care       Report consisted of patients Situation, Background, Assessment and   Recommendations(SBAR). Information from the following report(s) SBAR, Procedure Summary and MAR was reviewed with the receiving nurse. Opportunity for questions and clarification was provided.

## 2021-04-23 NOTE — DISCHARGE INSTRUCTIONS
Radial Cardiac Catheterization / Angiography Discharge Instructions    It is normal to feel tired the first couple days. Take it easy and follow the physicians instructions. CHECK THE CATHETER INSERTION SITE DAILY:  Remove the wrist dressing 24 hours after the procedure. You may shower 24 hours after the procedure. Wash with soap and water and pat dry. Gentle cleaning of the site with soap and water is sufficient, cover with a dry clean dressing or bandage. Do not apply creams or powders to the area. No soaking the wrist for 3 days. Leave the puncture site open to air after 24 hours post-procedure. CALL THE PHYSICIAN:  If the site becomes red, swollen or feels warm to the touch. If there is bleeding or drainage or if there is unusual pain at the radial site. If there is any minor oozing, you may apply a band-aid and remove after 12 hours. If the bleeding continues, hold pressure with the middle finger against the puncture site and the thumb against the back of the wrist, call 911 to be transported to the hospital.  DO NOT DRIVE YOURSELF, [place] [address]. ACTIVITY:  For the first 24 hours do not manipulate the wrist.  No lifting, pushing or pulling over 3-5 pounds with the affected wrist for 7 days and no straining the insertion site. Do not lift grocery bags or the garbage can, do not run the vacuum  or  for 7 days. Start with short walks as in the hospital and gradually increase as tolerated each day. It is recommended to walk 30 minutes 5-7 days per week. Follow your physicians instructions on activity. Avoid walking outside in extremes of heat or cold. Walk inside when it is cold and windy or hot and humid. THINGS TO KEEP IN MIND:  No driving for at least 24 hours, or as designated by your physician. Limit the number of times you go up and down the stairs  Take rests and pace yourself with activity.   Be careful and do not strain with bowel movements. MEDICATIONS:  Take all medications as prescribed. Call your physician if you have any questions. Keep an updated list of your medications with you at all times and give a list to your physician and pharmacist.    SIGNS AND SYMPTOMS:  Be cautions of symptoms of angina or recurrent symptoms such as chest discomfort, unusual shortness of breath or fatigue. These could be symptoms of restenosis, a new blockage or a heart attack. If your symptoms are relieved with rest it is still recommended that you notify your physician of recurrent chest pain or discomfort. For CHEST PAIN or symptoms of angina not relieved with rest:  If the discomfort is not relieved with rest and you have been prescribed Nitroglycerin, take as directed (taken under the tongue, one at a time 5 minutes apart for a total of 3 doses). If the discomfort is not relieved after the 3rd nitroglycerin, call 911. AFTER CARE:  Follow up with you physician as instructed. Follow a heart healthy diet with proper portion control, daily stress management, daily      exercise, blood pressure and cholesterol control, and smoking cessation.

## 2021-04-23 NOTE — PROGRESS NOTES
TRANSFER - IN REPORT:    Verbal report received from Audrey Khanna CVT  on Madelaine Putnam, Procedure: Cath procedure with no intervention , from the Cardiac Cath lab, for routine progression of care. Report consisted of patients Situation, Background, Assessment and Recommendations(SBAR). Information from the following report(s) Procedure Summary, MAR and Recent Results was reviewed with the receiving clinician. Opportunity for questions and clarification was provided. Assessment completed upon patients arrival to [address] [place] [address] and care assumed. Cardiac Cath Lab Recovery Arrival Note:     Madelaine Putnam arrived to [facility] recovery area. Patient procedure= Cath procedure with no intervention. Patient on cardiac monitor, non-invasive blood pressure, Patient status doing well without problems. Patient is A&Ox 4. Patient reports no pain, no chest pain, no n/v. Procedure site without any bleeding and no hematoma.

## 2021-04-23 NOTE — PROGRESS NOTES
Cardiac Cath Lab Procedure Area Arrival Note:    Ana Cristina Proctor arrived to Cardiac Cath Lab, Procedure Area. Patient identifiers verified with NAME and DATE OF BIRTH. Procedure verified with patient. Consent forms verified. Allergies verified. Patient informed of procedure and plan of care. Questions answered with review. Patient voiced understanding of procedure and plan of care. Patient on cardiac monitor, non-invasive blood pressure, SPO2 monitor. On  or O2 @ 2 lpm via NC.  IV of NS on pump at 75 ml/hr. Patient status doing well without problems. Patient is A&Ox 4. Patient reports no current chest pain or shortness of breath. Patient medicated during procedure with orders obtained and verified by Dr. Inga Triana. Refer to patients Cardiac Cath Lab PROCEDURE REPORT for vital signs, assessment, status, and response during procedure, printed at end of case. Printed report on chart or scanned into chart.

## 2021-04-23 NOTE — PROGRESS NOTES
1649:  Domitila Sanford ambulated @ 2662 (time) approximately 20 feet. Patient tolerated ambulation without adverse advents. right radial (right/left, groin/arm)  without bleeding or hematoma noted. 1701: I have reviewed discharge instructions with the patient. The patient verbalized understanding. 1716:  Patient wheeled out via wheelchair for discharge to the patient's wife.

## 2021-04-23 NOTE — ED NOTES
Patient did not answer in lobby x2. Had gone to get a soda. Placed in room 26. Xray aware and to take patient for xray at this time.

## 2021-04-23 NOTE — PROCEDURES
Findings  1. Stable mild to moderate coronary artery disease  2. Patent stents in mid circumflex and obtuse marginal branch placed 1 year ago  3. Low LVEDP    Access right radial no issues    Contrast 36 cc    Recommendations  1. Guideline directed medical therapy for CAD  2. Follow-up with Dr. Radha Nick  3.   Evaluate for alternative causes of chest discomfort

## 2021-04-23 NOTE — ED TRIAGE NOTES
Pt ambulatory to ED with c/o substernal chest pressure and sob onset 3 weeks ago. Denies N/V, dizziness or radiation of pain.  Pt reports hx of asthma and cardiac stent

## 2021-04-23 NOTE — ED PROVIDER NOTES
61-year-old male with history of CAD, status post stenting last year with prior negative cardiac stress test in June, 2020, who follows with Dr. Sourav Bridges, cardiology, presents to the emergency department noting a 3-week history of intermittent exertional substernal chest pressure and associated shortness of breath. He denies any symptoms currently but states that when he exerts himself even minor exertion for a couple of minutes he develops the symptoms and has to stop. He denies any associated nausea, vomiting, dizziness, or radiation of the pain. He states that this does feel similar to his prior symptoms of heart disease that he has had in the past.  He has not taken anything for his symptoms other than his normal medications including aspirin and Plavix. He denies any cough, URI symptoms or chest tenderness. On exam he denies any current symptoms, and states that his symptoms seem to last for couple of minutes before resolving with rest.  He states that his pain in his chest is 7/10 in severity when it comes on. Past Medical History:   Diagnosis Date    Asthma     CAD (coronary artery disease)     Colon cancer ([facility] [address]     colon       Past Surgical History:   Procedure Laterality Date    HX CORONARY STENT PLACEMENT      HX KNEE REPLACEMENT Right     HX TOTAL COLECTOMY      IR ANGIOPLASTY  IC PERC W STNT Left          History reviewed. No pertinent family history.     Social History     Socioeconomic History    Marital status:      Spouse name: Not on file    Number of children: Not on file    Years of education: Not on file    Highest education level: Not on file   Occupational History    Not on file   Social Needs    Financial resource strain: Not on file    Food insecurity     Worry: Not on file     Inability: Not on file    Transportation needs     Medical: Not on file     Non-medical: Not on file   Tobacco Use    Smoking status: Current Every Day Smoker    Smokeless tobacco: Never Used   Substance and Sexual Activity    Alcohol use: Yes     Alcohol/week: 2.0 standard drinks     Types: 2 Cans of beer per week     Comment: twice a month    Drug use: Not Currently    Sexual activity: Not on file   Lifestyle    Physical activity     Days per week: Not on file     Minutes per session: Not on file    Stress: Not on file   Relationships    Social connections     Talks on phone: Not on file     Gets together: Not on file     Attends [employer] service: Not on file     Active member of club or organization: Not on file     Attends meetings of clubs or organizations: Not on file     Relationship status: Not on file    Intimate partner violence     Fear of current or ex partner: Not on file     Emotionally abused: Not on file     Physically abused: Not on file     Forced sexual activity: Not on file   Other Topics Concern    Not on file   Social History Narrative    Not on file         ALLERGIES: Patient has no known allergies. Review of Systems   Constitutional: Positive for activity change. Negative for appetite change, chills and fever. HENT: Negative for congestion, rhinorrhea, sinus pain, sneezing and sore throat. Eyes: Negative for photophobia and visual disturbance. Respiratory: Positive for chest tightness and shortness of breath. Negative for cough. Cardiovascular: Positive for chest pain. Gastrointestinal: Negative for abdominal pain, blood in stool, constipation, diarrhea, nausea and vomiting. Genitourinary: Negative for difficulty urinating, dysuria, flank pain, hematuria, penile pain and testicular pain. Musculoskeletal: Negative for arthralgias, back pain, myalgias and neck pain. Skin: Negative for rash and wound. Neurological: Negative for syncope, weakness, light-headedness, numbness and headaches. Psychiatric/Behavioral: Negative for self-injury and suicidal ideas. All other systems reviewed and are negative.       Vitals:    04/23/21 0748   BP: (!) 145/86   Pulse: 99   Resp: 15   Temp: 98.6 °F (37 °C)   SpO2: 95%   Weight: 85.3 kg (188 lb)   Height: 5' 6\" (1.676 m)            Physical Exam  Vitals signs and nursing note reviewed. Constitutional:       General: He is not in acute distress. Appearance: Normal appearance. He is well-developed. He is not diaphoretic. HENT:      Head: Normocephalic and atraumatic. Nose: Nose normal.   Eyes:      Extraocular Movements: Extraocular movements intact. Conjunctiva/sclera: Conjunctivae normal.      Pupils: Pupils are equal, round, and reactive to light. Neck:      Musculoskeletal: Neck supple. Cardiovascular:      Rate and Rhythm: Normal rate and regular rhythm. Heart sounds: Normal heart sounds. Pulmonary:      Effort: Pulmonary effort is normal.      Breath sounds: Normal breath sounds. Abdominal:      General: There is no distension. Palpations: Abdomen is soft. Tenderness: There is no abdominal tenderness. Musculoskeletal:         General: No tenderness. Right lower leg: He exhibits no tenderness. No edema. Left lower leg: He exhibits no tenderness. No edema. Skin:     General: Skin is warm and dry. Neurological:      General: No focal deficit present. Mental Status: He is alert and oriented to person, place, and time. Cranial Nerves: No cranial nerve deficit. Sensory: No sensory deficit. Motor: No weakness. Coordination: Coordination normal.          MDM   56-year-old male presents with anginal type symptoms over the last 3 weeks. History of CAD. He is afebrile with vital signs stable no acute distress. EKG shows normal sinus rhythm with a rate of 94 bpm with no acute ST or T wave abnormalities suggestive of ischemia. Labs returned reassuringly showing no significant abnormalities, negative initial troponin.   Given concerning history for developing unstable angina and known CAD, cardiology was consulted and saw the patient at the bedside. Repeat troponin negative. Given his concerning history the plan to admit for further evaluation and likely cath.       Procedures

## 2021-04-23 NOTE — PROGRESS NOTES
I have reviewed discharge instructions with the patient. The patient verbalized understanding. Copy of discharge instructions given to patient. Dr. Perla Riddle has been to talk with patient at the bedside.

## 2021-04-23 NOTE — CONSULTS
Dr. Chung Adjutant. [phone]            Cardiology Consult/Progress Note      Requesting/referring provider:Dr. Estelle Natarajan    Reason for Consult: Chest pain    Assessment/Plan:  1. Accelerated angina possible ACS  2. History of coronary disease status post PCI to obtuse marginal as well as mid circumflex in March 2020 for non-STEMI  3. Hypertension excellent lipid numbers still smoking  5. Hyperlipidemia    Mr. Vivian Olivo was seen at the request of ER physician. He reports significant chest pain over the past 3 weeks which has been progressive now with resting angina. While the troponins have been negative and ECG is unremarkable, he does have prior stents which could potentially cause unstable angina symptoms without troponin elevation. It would be reasonable to proceed with a cardiac catheterization and treat him as possible acute coronary syndrome. Further recommendation will be based on his coronary anatomy. I discussed the risks/benefits/alternatives of the procedure with the patient. Risks include (but are not limited to) bleeding, infection,stroke,heart attack, need for emergency surgery/pericardiocentesis, need for dialysis or death. The patient understands and agrees to proceed. Investigations ordered    []    High complexity decision making was performed  []    Patient is at high-risk of decompensation with multiple organ involvement  []    Complex/difficult social determinants of health outcomes    Investigations personally reviewed and interpreted  ECG April 23, 2021: Sinus rhythm, nonspecific ST changes  Chest x-ray unremarkable  Troponins negative x2  Exercise stress echocardiogram June 2020: Reduced exercise capacity, no evidence of exertional/stress related wall motion abnormality or ECG changes. HPI: Cheyanne Murphy, a 60y.o. year-old who presents for evaluation of chest pain.   He has history of coronary disease and had a non-STEMI in March 2020 undergoing cardiac catheterization which demonstrated artery disease and marginal and she was treated with two drug-eluting stents. He had mild to moderate disease elsewhere. He had normal LV function. Mid circumflex he did well post PCI for almost 1 year. Recently about 3 weeks ago he started noticing initially exertional angina and over the past 2 days has noted rest angina as well. His ECG appears unremarkable in the emergency room. Chest pain is not associated with any shortness of breath but occasional dizziness is reported. No radiation of the pain is noted. Chest pain is mostly precordial in location and at times appear sharp. He  has a past medical history of Asthma, CAD (coronary artery disease), and Colon cancer ([address] Review of system:Patient reports no dyspnea/PND/Orthpnea. He reports no cough/fever/focal neurological deficits/abdominal pain. All other systems negative except as above. History reviewed. No pertinent family history. Social History     Socioeconomic History    Marital status:      Spouse name: Not on file    Number of children: Not on file    Years of education: Not on file    Highest education level: Not on file   Tobacco Use    Smoking status: Current Every Day Smoker    Smokeless tobacco: Never Used   Substance and Sexual Activity    Alcohol use: Yes     Alcohol/week: 2.0 standard drinks     Types: 2 Cans of beer per week     Comment: twice a month    Drug use: Not Currently      PE  Vitals:    04/23/21 0748 04/23/21 1230 04/23/21 1330 04/23/21 1333   BP: (!) 145/86 120/87 126/82    Pulse: 99 84 77 78   Resp: 15 21 14 15   Temp: 98.6 °F (37 °C)      SpO2: 95% 95% 92% 94%   Weight: 188 lb (85.3 kg)      Height: 5' 6\" (1.676 m)       Body mass index is 30.34 kg/m². General:    Alert, cooperative, no distress. Psychiatric:    Normal Mood and affect    Eye/ENT:      Pupils equal, No asymmetry, Conjunctival pink.  Able to hear voice at normal amplitude   Lungs:      Visibly symmetric chest expansion, No palpable tenderness. Clear to auscultation bilaterally. Heart[de-identified]    Regular rate and rhythm, S1, S2 normal, no murmur, click, rub or gallop. No JVD, Normal palpable peripheral pulses. No cyanosis   Abdomen:     Soft, non-tender. Bowel sounds normal. No masses,  No      organomegaly. Extremities:   Extremities normal, atraumatic, no edema. Neurologic:   CN II-XII grossly intact. No gross focal deficits           Recent Labs:  Lab Results   Component Value Date/Time    Cholesterol, total 136 04/20/2021 08:37 AM    HDL Cholesterol 34 (L) 04/20/2021 08:37 AM    LDL, calculated 65 04/20/2021 08:37 AM    LDL, calculated 98 06/22/2020 11:47 AM    Triglyceride 223 (H) 04/20/2021 08:37 AM    CHOL/HDL Ratio 7.5 (H) 03/27/2020 06:59 AM     Lab Results   Component Value Date/Time    Creatinine 0.91 04/23/2021 08:00 AM     Lab Results   Component Value Date/Time    BUN 9 04/23/2021 08:00 AM     Lab Results   Component Value Date/Time    Potassium 4.0 04/23/2021 08:00 AM     No results found for: HBA1C, HGBE8, VFA6FBWA  Lab Results   Component Value Date/Time    HGB 17.7 (H) 04/23/2021 08:00 AM     Lab Results   Component Value Date/Time    PLATELET 544 30/29/6286 08:00 AM       Reviewed:  Past Medical History:   Diagnosis Date    Asthma     CAD (coronary artery disease)     Colon cancer ([facility] [address]     colon     Social History     Tobacco Use   Smoking Status Current Every Day Smoker   Smokeless Tobacco Never Used     Social History     Substance and Sexual Activity   Alcohol Use Yes    Alcohol/week: 2.0 standard drinks    Types: 2 Cans of beer per week    Comment: twice a month     No Known Allergies  History reviewed. No pertinent family history.      Current Facility-Administered Medications   Medication Dose Route Frequency    fentaNYL citrate (PF) injection    PRN    midazolam (VERSED) injection    PRN    0.9% sodium chloride infusion    CONTINUOUS Glorietta Lefort, MD04/23/21     ATTENTION:   This medical record was transcribed using an electronic medical records/speech recognition system. Although proofread, it may and can contain electronic, spelling and other errors. Corrections may be executed at a later time. Please feel free to contact us for any clarifications as needed.     [facility] [place]  [facility], [place], [place]. [phone]

## 2021-04-28 ENCOUNTER — OFFICE VISIT (OUTPATIENT)
Dept: CARDIOLOGY CLINIC | Age: 56
End: 2021-04-28
Payer: COMMERCIAL

## 2021-04-28 ENCOUNTER — TELEPHONE (OUTPATIENT)
Dept: CARDIOLOGY CLINIC | Age: 56
End: 2021-04-28

## 2021-04-28 VITALS
RESPIRATION RATE: 18 BRPM | BODY MASS INDEX: 29.57 KG/M2 | WEIGHT: 184 LBS | HEIGHT: 66 IN | HEART RATE: 83 BPM | OXYGEN SATURATION: 96 % | DIASTOLIC BLOOD PRESSURE: 82 MMHG | SYSTOLIC BLOOD PRESSURE: 126 MMHG

## 2021-04-28 DIAGNOSIS — I25.10 CORONARY ARTERY DISEASE INVOLVING NATIVE CORONARY ARTERY OF NATIVE HEART WITHOUT ANGINA PECTORIS: ICD-10-CM

## 2021-04-28 DIAGNOSIS — Z72.0 TOBACCO ABUSE: ICD-10-CM

## 2021-04-28 DIAGNOSIS — R07.9 CHEST PAIN, UNSPECIFIED TYPE: ICD-10-CM

## 2021-04-28 DIAGNOSIS — E78.49 OTHER HYPERLIPIDEMIA: Primary | ICD-10-CM

## 2021-04-28 PROCEDURE — 99214 OFFICE O/P EST MOD 30 MIN: CPT | Performed by: SPECIALIST

## 2021-04-28 RX ORDER — ISOSORBIDE MONONITRATE 30 MG/1
30 TABLET, EXTENDED RELEASE ORAL DAILY
Qty: 90 TAB | Refills: 1 | Status: SHIPPED | OUTPATIENT
Start: 2021-04-28 | End: 2021-06-14

## 2021-04-28 NOTE — PROGRESS NOTES
[address] [facility] VASCULAR [place]                                                            OFFICE NOTE        Leanna Beck M.D.,REJI Crumo Bolivar   1965  [id number]    Date/Time:  4/28/20219:17 AM          SUBJECTIVE:  He is doing well he has had 1 more episode of chest discomfort described as a tightness while walking. To be noted he was seen in the emergency room on April 23, 2021 for chest discomfort underwent cardiac catheterization with Dr. Maya Cleveland which failed to reveal any recurrent significant stenosis. 1.  CAD:       Assessment/Plan      1. CAD: Status post PCI of obtuse marginal branch and circumflex artery in March 2020. He has had recurrence of chest discomfort with activities in April 2021 which prompted emergency room visit. At that time he was seen by Dr. Maya Cleveland and underwent cardiac catheterization. Cardiac catheterization on April 2021 failed to reveal any recurrent stenosis and ascertain the patency of all stents. He has had 1 more episode of chest discomfort which seems that to be typical for angina after his cardiac catheterization he tells me. We have discussed potential other causes of chest tightness given the fact that the stents are patent. Clearly spasm is a possibility. I will start him on Imdur 30 mg daily side effects explained. Avoid cialis or similar while taking ntg discussed    He will continue with aspirin 81 mg daily and for now we have discussed continuing Plavix for a total of 18 months since he has some symptoms. We also discussed the possibility of GI evaluation possibility esophageal spasm if he has recurrent discomfort despite not       2. Hyperlipidemia: Continue with full dose of Crestor. Lipid panel from April 2021 is acceptable we discussed low HDL and increased triglycerides I want him to decrease his carbohydrate intake.   We have discussed exercise and walking at least 150 minutes a week. 3.  Tobacco abuse: He had stop smoking in July 2020 but he has restarted in January 2021 smoking cessation again stressed. 4.  Colon CA: Status post colon resection closely followed by primary care physician. 5.  Hypertension: Well controlled currently. Otherwise I will see him back in 6 to 8 weeks. HPI     Patient with a history of hyperlipidemia, colon cancer status post resection and tobacco abuse who presented to [facility] emergency room on March 27, 2020 with chest pain suggestive of unstable angina.     The patient did rule out for myocardial infarction but given the high clinical suspicion for unstable angina he underwent cardiac catheterization as noted below.     S/p PCI of OM and CX on 3/20 residual 40% and 20% in LAD and 50% in RCA     Quit smoking on 7/20            CARDIAC STUDIES        06/25/20   ECHO STRESS 07/06/2020 7/8/2020    Narrative · Normal stress echocardiogram. Low risk study. · Baseline ECG: Normal sinus rhythm. · Low risk [facility] treadmill score. · Negative stress test.        Signed by: Mikey Garcia MD                        04/23/21   CARDIAC PROCEDURE 04/23/2021 4/23/2021    Narrative Findings  1. Stable mild to moderate coronary artery disease  2. Patent stents in mid circumflex and obtuse marginal branch placed 1   year ago  3. Low LVEDP    Access right radial no issues    Contrast 36 cc    Recommendations  1. Guideline directed medical therapy for CAD  2. Follow-up with Dr. Radha Nick  3. Evaluate for alternative causes of chest discomfort     Signed by: Gregory Odom MD           EKG Results     None              IMAGING      MRI Results (most recent):  No results found for this or any previous visit. CT Results (most recent):  No results found for this or any previous visit.     XR Results (most recent):  Results from Hospital Encounter encounter on 04/23/21   XR CHEST PA LAT    Narrative INDICATION:  chest pain     COMPARISON: March 2020    FINDINGS: PA and lateral views of the chest demonstrate a stable  cardiomediastinal silhouette and clear lungs bilaterally. The visualized osseous  structures are unremarkable. Impression No acute process             Past Medical History:   Diagnosis Date    Asthma     CAD (coronary artery disease)     Colon cancer ([address]     colon     Past Surgical History:   Procedure Laterality Date    HX CORONARY STENT PLACEMENT      HX KNEE REPLACEMENT Right     HX TOTAL COLECTOMY      IR ANGIOPLASTY  IC PERC W STNT Left      Social History     Tobacco Use    Smoking status: Current Every Day Smoker     Types: Cigarettes    Smokeless tobacco: Never Used    Tobacco comment: 15 cig a day   Substance Use Topics    Alcohol use: Yes     Alcohol/week: 2.0 standard drinks     Types: 2 Cans of beer per week     Comment: twice a month    Drug use: Not Currently     No family history on file. Allergies   Allergen Reactions    Bee Pollen Rash         Visit Vitals  /82 (BP 1 Location: Left upper arm, BP Patient Position: Sitting, BP Cuff Size: Adult)   Pulse 83   Resp 18   Ht 5' 6\" (1.676 m)   Wt 184 lb (83.5 kg)   SpO2 96%   BMI 29.70 kg/m²         Last 3 Recorded Weights in this Encounter    04/28/21 0856   Weight: 184 lb (83.5 kg)            Review of Systems:   Pertinent items are noted in the History of Present Illness. Visit Vitals  /82 (BP 1 Location: Left upper arm, BP Patient Position: Sitting, BP Cuff Size: Adult)   Pulse 83   Resp 18   Ht 5' 6\" (1.676 m)   Wt 184 lb (83.5 kg)   SpO2 96%   BMI 29.70 kg/m²     General Appearance:  Well developed, well nourished,alert and oriented x 3, and individual in no acute distress. Ears/Nose/Mouth/Throat:   Hearing grossly normal.         Neck: Supple. Chest:   Lungs clear to auscultation bilaterally. Cardiovascular:  Regular rate and rhythm, S1, S2 normal, no murmur.    Abdomen:   Soft, non-tender, bowel sounds are active. Extremities: No edema bilaterally. Skin: Warm and dry. Current Outpatient Medications on File Prior to Visit   Medication Sig Dispense Refill    traZODone (DESYREL) 150 mg tablet Take 150 mg by mouth nightly.  aspirin delayed-release 81 mg tablet Take 81 mg by mouth two (2) times a day.  clopidogreL (PLAVIX) 75 mg tab TAKE 1 TABLET BY MOUTH EVERY DAY 90 Tab 1    rosuvastatin (CRESTOR) 40 mg tablet TAKE 1 TABLET BY MOUTH EVERY NIGHT 90 Tab 1    albuterol (PROVENTIL HFA, VENTOLIN HFA, PROAIR HFA) 90 mcg/actuation inhaler Take 2 Puffs by inhalation every four (4) hours as needed for Wheezing.  mometasone-formoterol (Dulera) 100-5 mcg/actuation HFA inhaler Take 2 Puffs by inhalation two (2) times a day. No current facility-administered medications on file prior to visit. Macy Bond. Vivian Patee \"Javy\" had no medications administered during this visit. Current Outpatient Medications   Medication Sig    traZODone (DESYREL) 150 mg tablet Take 150 mg by mouth nightly.  aspirin delayed-release 81 mg tablet Take 81 mg by mouth two (2) times a day.  clopidogreL (PLAVIX) 75 mg tab TAKE 1 TABLET BY MOUTH EVERY DAY    rosuvastatin (CRESTOR) 40 mg tablet TAKE 1 TABLET BY MOUTH EVERY NIGHT    albuterol (PROVENTIL HFA, VENTOLIN HFA, PROAIR HFA) 90 mcg/actuation inhaler Take 2 Puffs by inhalation every four (4) hours as needed for Wheezing.  mometasone-formoterol (Dulera) 100-5 mcg/actuation HFA inhaler Take 2 Puffs by inhalation two (2) times a day. No current facility-administered medications for this visit.           Lab Results   Component Value Date/Time    Cholesterol, total 136 04/20/2021 08:37 AM    HDL Cholesterol 34 (L) 04/20/2021 08:37 AM    LDL, calculated 65 04/20/2021 08:37 AM    LDL, calculated 98 06/22/2020 11:47 AM    VLDL, calculated 37 04/20/2021 08:37 AM    VLDL, calculated 29 06/22/2020 11:47 AM    Triglyceride 223 (H) 04/20/2021 08:37 AM    CHOL/HDL Ratio 7.5 (H) 03/27/2020 06:59 AM       Lab Results   Component Value Date/Time    Sodium 137 04/23/2021 08:00 AM    Potassium 4.0 04/23/2021 08:00 AM    Chloride 106 04/23/2021 08:00 AM    CO2 24 04/23/2021 08:00 AM    Anion gap 7 04/23/2021 08:00 AM    Glucose 103 (H) 04/23/2021 08:00 AM    BUN 9 04/23/2021 08:00 AM    Creatinine 0.91 04/23/2021 08:00 AM    BUN/Creatinine ratio 10 (L) 04/23/2021 08:00 AM    GFR est AA >60 04/23/2021 08:00 AM    GFR est non-AA >60 04/23/2021 08:00 AM    Calcium 9.4 04/23/2021 08:00 AM       Lab Results   Component Value Date/Time    ALT (SGPT) 18 04/23/2021 08:00 AM    Alk.  phosphatase 69 04/23/2021 08:00 AM    Bilirubin, direct 0.11 04/20/2021 08:37 AM    Bilirubin, total 0.4 04/23/2021 08:00 AM       Lab Results   Component Value Date/Time    WBC 8.8 04/23/2021 08:00 AM    HGB 17.7 (H) 04/23/2021 08:00 AM    HCT 51.0 (H) 04/23/2021 08:00 AM    PLATELET 489 33/54/5197 08:00 AM    MCV 88.7 04/23/2021 08:00 AM       No results found for: TSH, TSH2, TSH3, TSHP, TSHEXT      Lab Results   Component Value Date/Time    Cholesterol, total 136 04/20/2021 08:37 AM    Cholesterol, total 149 10/19/2020 08:56 AM    Cholesterol, total 158 06/22/2020 11:47 AM    Cholesterol, total 217 (H) 03/27/2020 06:59 AM    HDL Cholesterol 34 (L) 04/20/2021 08:37 AM    HDL Cholesterol 33 (L) 10/19/2020 08:56 AM    HDL Cholesterol 31 (L) 06/22/2020 11:47 AM    HDL Cholesterol 29 03/27/2020 06:59 AM    LDL, calculated 65 04/20/2021 08:37 AM    LDL, calculated 84 10/19/2020 08:56 AM    LDL, calculated 98 06/22/2020 11:47 AM    LDL, calculated 122.8 (H) 03/27/2020 06:59 AM    Triglyceride 223 (H) 04/20/2021 08:37 AM    Triglyceride 189 (H) 10/19/2020 08:56 AM    Triglyceride 147 06/22/2020 11:47 AM    Triglyceride 326 (H) 03/27/2020 06:59 AM    CHOL/HDL Ratio 7.5 (H) 03/27/2020 06:59 AM                Please note that this dictation was completed with [employer], the computer voice recognition software. Quite often unanticipated grammatical, syntax, homophones, and other interpretative errors are inadvertently transcribed by the computer software. Please disregard these errors. Please excuse any errors that have escaped final proofreading.

## 2021-04-28 NOTE — PROGRESS NOTES
.  Visit Vitals  /82 (BP 1 Location: Left upper arm, BP Patient Position: Sitting, BP Cuff Size: Adult)   Pulse 83   Resp 18   Ht 5' 6\" (1.676 m)   Wt 184 lb (83.5 kg)   SpO2 96%   BMI 29.70 kg/m²

## 2021-06-14 ENCOUNTER — OFFICE VISIT (OUTPATIENT)
Dept: CARDIOLOGY CLINIC | Age: 56
End: 2021-06-14
Payer: COMMERCIAL

## 2021-06-14 VITALS
BODY MASS INDEX: 28.93 KG/M2 | SYSTOLIC BLOOD PRESSURE: 110 MMHG | DIASTOLIC BLOOD PRESSURE: 80 MMHG | HEIGHT: 66 IN | RESPIRATION RATE: 16 BRPM | WEIGHT: 180 LBS | HEART RATE: 83 BPM | OXYGEN SATURATION: 97 %

## 2021-06-14 DIAGNOSIS — I25.10 CORONARY ARTERY DISEASE INVOLVING NATIVE CORONARY ARTERY OF NATIVE HEART WITHOUT ANGINA PECTORIS: ICD-10-CM

## 2021-06-14 DIAGNOSIS — Z72.0 TOBACCO ABUSE: ICD-10-CM

## 2021-06-14 DIAGNOSIS — E78.49 OTHER HYPERLIPIDEMIA: Primary | ICD-10-CM

## 2021-06-14 PROCEDURE — 99214 OFFICE O/P EST MOD 30 MIN: CPT | Performed by: SPECIALIST

## 2021-06-14 NOTE — PROGRESS NOTES
Visit Vitals  /80   Pulse 83   Resp 16   Ht 5' 6\" (1.676 m)   Wt 180 lb (81.6 kg)   SpO2 97%   BMI 29.05 kg/m²

## 2021-06-14 NOTE — PROGRESS NOTES
[address] [facility]                                                            OFFICE NOTE        Arturo Rosas M.D.,REJI            Dorys Barber   1965  [id number]    Date/Time:  6/14/20213:31 PM          SUBJECTIVE:  Doing well  No cp or sob or palpitations   he feels great he tells me       Assessment/Plan  1. CAD: Status post PCI of obtuse marginal branch and circumflex artery in March 2020.     He has had recurrence of chest discomfort with activities in April 2021 which prompted emergency room visit.     At that time he was seen by Dr. Alec Tejada and underwent cardiac catheterization.     Cardiac catheterization on April 2021 failed to reveal any recurrent stenosis and ascertained the patency of all stents.     Unable to tolerate imdur for severe HA but now absolutely no recurrent cp continue off imdur for the time being     He will continue with aspirin 81 mg daily and for now we have discussed continuing Plavix for a total of 18 months      We also discussed the possibility of GI evaluation possibility esophageal spasm if he has recurrent discomfort         2. Hyperlipidemia: Continue with full dose of Crestor. Lipid panel from April 2021 is acceptable we discussed low HDL and increased triglycerides I want him to decrease his carbohydrate intake. We have discussed exercise and walking at least 150 minutes a week.     3. Tobacco abuse: He had stop smoking in July 2020 but he has restarted in January 2021 smoking cessation again stressed.     4.  Colon CA: Status post colon resection closely followed by primary care physician.     5. Hypertension: Well controlled currently.     Otherwise I will see him back in 6 months        HPI                 CARDIAC STUDIES        06/25/20    ECHO STRESS 07/06/2020 7/8/2020    Interpretation Summary  · Normal stress echocardiogram. Low risk study.   · Baseline ECG: Normal sinus rhythm. · Low risk [facility] treadmill score. · Negative stress test.    Signed by: Mikey Garcia MD on 7/6/2020  1:42 PM                      04/23/21    CARDIAC PROCEDURE 04/23/2021 4/23/2021    Conclusion  Findings  1. Stable mild to moderate coronary artery disease  2. Patent stents in mid circumflex and obtuse marginal branch placed 1 year ago  3. Low LVEDP    Access right radial no issues    Contrast 36 cc    Recommendations  1. Guideline directed medical therapy for CAD  2. Follow-up with Dr. Radha Nick  3. Evaluate for alternative causes of chest discomfort    Signed by: Gregory Odom MD on 4/23/2021  3:35 PM          EKG Results     None              IMAGING      MRI Results (most recent):  No results found for this or any previous visit. CT Results (most recent):  No results found for this or any previous visit. XR Results (most recent):  Results from Hospital Encounter encounter on 04/23/21    XR CHEST PA LAT    Narrative  INDICATION:  chest pain    COMPARISON: March 2020    FINDINGS: PA and lateral views of the chest demonstrate a stable  cardiomediastinal silhouette and clear lungs bilaterally. The visualized osseous  structures are unremarkable. Impression  No acute process          Past Medical History:   Diagnosis Date    Asthma     CAD (coronary artery disease)     [address] [facility] ([facility] [address]     colon     Past Surgical History:   Procedure Laterality Date    HX CORONARY STENT PLACEMENT      HX KNEE REPLACEMENT Right     HX TOTAL COLECTOMY      IR ANGIOPLASTY  IC PERC W STNT Left      Social History     Tobacco Use    Smoking status: Current Every Day Smoker     Types: Cigarettes    Smokeless tobacco: Never Used    Tobacco comment: 15 cig a day   Substance Use Topics    Alcohol use: Yes     Alcohol/week: 2.0 standard drinks     Types: 2 Cans of beer per week     Comment: twice a month    Drug use: Not Currently     No family history on file.   Allergies   Allergen Reactions    Bee Pollen Rash         Visit Vitals  /80   Pulse 83   Resp 16   Ht 5' 6\" (1.676 m)   Wt 180 lb (81.6 kg)   SpO2 97%   BMI 29.05 kg/m²         Last 3 Recorded Weights in this Encounter    06/14/21 1453   Weight: 180 lb (81.6 kg)            Review of Systems:   Pertinent items are noted in the History of Present Illness. Visit Vitals  /80   Pulse 83   Resp 16   Ht 5' 6\" (1.676 m)   Wt 180 lb (81.6 kg)   SpO2 97%   BMI 29.05 kg/m²     General Appearance:  Well developed, well nourished,alert and oriented x 3, and individual in no acute distress. Ears/Nose/Mouth/Throat:   Hearing grossly normal.         Neck: Supple. Chest:   Lungs clear to auscultation bilaterally. Cardiovascular:  Regular rate and rhythm, S1, S2 normal, no murmur. Abdomen:   Soft, non-tender, bowel sounds are active. Extremities: No edema bilaterally. Skin: Warm and dry. Current Outpatient Medications on File Prior to Visit   Medication Sig Dispense Refill    albuterol (PROVENTIL HFA, VENTOLIN HFA, PROAIR HFA) 90 mcg/actuation inhaler Take 2 Puffs by inhalation every four (4) hours as needed for Wheezing.  traZODone (DESYREL) 150 mg tablet Take 150 mg by mouth nightly.  aspirin delayed-release 81 mg tablet Take 81 mg by mouth two (2) times a day.  clopidogreL (PLAVIX) 75 mg tab TAKE 1 TABLET BY MOUTH EVERY DAY 90 Tab 1    rosuvastatin (CRESTOR) 40 mg tablet TAKE 1 TABLET BY MOUTH EVERY NIGHT 90 Tab 1    mometasone-formoterol (Dulera) 100-5 mcg/actuation HFA inhaler Take 2 Puffs by inhalation two (2) times a day.  isosorbide mononitrate ER (IMDUR) 30 mg tablet Take 1 Tab by mouth daily. (Patient not taking: Reported on 6/14/2021) 90 Tab 1     No current facility-administered medications on file prior to visit. Cristiano Horne. Dax Helms \"Javy\" had no medications administered during this visit.      Current Outpatient Medications   Medication Sig    albuterol (PROVENTIL HFA, VENTOLIN HFA, PROAIR HFA) 90 mcg/actuation inhaler Take 2 Puffs by inhalation every four (4) hours as needed for Wheezing.  traZODone (DESYREL) 150 mg tablet Take 150 mg by mouth nightly.  aspirin delayed-release 81 mg tablet Take 81 mg by mouth two (2) times a day.  clopidogreL (PLAVIX) 75 mg tab TAKE 1 TABLET BY MOUTH EVERY DAY    rosuvastatin (CRESTOR) 40 mg tablet TAKE 1 TABLET BY MOUTH EVERY NIGHT    mometasone-formoterol (Dulera) 100-5 mcg/actuation HFA inhaler Take 2 Puffs by inhalation two (2) times a day.  isosorbide mononitrate ER (IMDUR) 30 mg tablet Take 1 Tab by mouth daily. (Patient not taking: Reported on 6/14/2021)     No current facility-administered medications for this visit. Lab Results   Component Value Date/Time    Cholesterol, total 136 04/20/2021 08:37 AM    HDL Cholesterol 34 (L) 04/20/2021 08:37 AM    LDL, calculated 65 04/20/2021 08:37 AM    LDL, calculated 98 06/22/2020 11:47 AM    VLDL, calculated 37 04/20/2021 08:37 AM    VLDL, calculated 29 06/22/2020 11:47 AM    Triglyceride 223 (H) 04/20/2021 08:37 AM    CHOL/HDL Ratio 7.5 (H) 03/27/2020 06:59 AM       Lab Results   Component Value Date/Time    Sodium 137 04/23/2021 08:00 AM    Potassium 4.0 04/23/2021 08:00 AM    Chloride 106 04/23/2021 08:00 AM    CO2 24 04/23/2021 08:00 AM    Anion gap 7 04/23/2021 08:00 AM    Glucose 103 (H) 04/23/2021 08:00 AM    BUN 9 04/23/2021 08:00 AM    Creatinine 0.91 04/23/2021 08:00 AM    BUN/Creatinine ratio 10 (L) 04/23/2021 08:00 AM    GFR est AA >60 04/23/2021 08:00 AM    GFR est non-AA >60 04/23/2021 08:00 AM    Calcium 9.4 04/23/2021 08:00 AM       Lab Results   Component Value Date/Time    ALT (SGPT) 18 04/23/2021 08:00 AM    Alk.  phosphatase 69 04/23/2021 08:00 AM    Bilirubin, direct 0.11 04/20/2021 08:37 AM    Bilirubin, total 0.4 04/23/2021 08:00 AM       Lab Results   Component Value Date/Time    WBC 8.8 04/23/2021 08:00 AM    HGB 17.7 (H) 04/23/2021 08:00 AM    HCT 51.0 (H) 04/23/2021 08:00 AM    PLATELET 308 08/44/3889 08:00 AM    MCV 88.7 04/23/2021 08:00 AM       No results found for: TSH, TSH2, TSH3, TSHP, TSHEXT      Lab Results   Component Value Date/Time    Cholesterol, total 136 04/20/2021 08:37 AM    Cholesterol, total 149 10/19/2020 08:56 AM    Cholesterol, total 158 06/22/2020 11:47 AM    Cholesterol, total 217 (H) 03/27/2020 06:59 AM    HDL Cholesterol 34 (L) 04/20/2021 08:37 AM    HDL Cholesterol 33 (L) 10/19/2020 08:56 AM    HDL Cholesterol 31 (L) 06/22/2020 11:47 AM    HDL Cholesterol 29 03/27/2020 06:59 AM    LDL, calculated 65 04/20/2021 08:37 AM    LDL, calculated 84 10/19/2020 08:56 AM    LDL, calculated 98 06/22/2020 11:47 AM    LDL, calculated 122.8 (H) 03/27/2020 06:59 AM    Triglyceride 223 (H) 04/20/2021 08:37 AM    Triglyceride 189 (H) 10/19/2020 08:56 AM    Triglyceride 147 06/22/2020 11:47 AM    Triglyceride 326 (H) 03/27/2020 06:59 AM    CHOL/HDL Ratio 7.5 (H) 03/27/2020 06:59 AM                Please note that this dictation was completed with [employer], the computer voice recognition software. Quite often unanticipated grammatical, syntax, homophones, and other interpretative errors are inadvertently transcribed by the computer software. Please disregard these errors. Please excuse any errors that have escaped final proofreading.

## 2021-06-16 RX ORDER — ASPIRIN 325 MG
TABLET, DELAYED RELEASE (ENTERIC COATED) ORAL
Qty: 90 TABLET | OUTPATIENT
Start: 2021-06-16

## 2021-08-16 RX ORDER — ROSUVASTATIN CALCIUM 40 MG/1
40 TABLET, COATED ORAL
Qty: 90 TABLET | Refills: 1 | Status: SHIPPED | OUTPATIENT
Start: 2021-08-16 | End: 2022-01-06

## 2021-08-16 RX ORDER — ROSUVASTATIN CALCIUM 40 MG/1
TABLET, COATED ORAL
Qty: 90 TABLET | Refills: 1 | OUTPATIENT
Start: 2021-08-16

## 2021-08-16 NOTE — TELEPHONE ENCOUNTER
Cardiologist: Dr. John Rueda    Last appt: 6/14/2021  Future Appointments   Date Time Provider Nic Lundbergi   12/16/2021 10:40 AM MD QUAN Ryder BS AMB       Requested Prescriptions     Signed Prescriptions Disp Refills    rosuvastatin (CRESTOR) 40 mg tablet 90 Tablet 1     Sig: Take 1 Tablet by mouth nightly.      Authorizing Provider: Anil Abdi     Ordering User: [facility]         Refills VO per Dr. Rosemary Timmons.

## 2022-01-06 ENCOUNTER — OFFICE VISIT (OUTPATIENT)
Dept: CARDIOLOGY CLINIC | Age: 57
End: 2022-01-06
Payer: COMMERCIAL

## 2022-01-06 VITALS
OXYGEN SATURATION: 95 % | DIASTOLIC BLOOD PRESSURE: 82 MMHG | HEIGHT: 66 IN | RESPIRATION RATE: 18 BRPM | WEIGHT: 190 LBS | BODY MASS INDEX: 30.53 KG/M2 | SYSTOLIC BLOOD PRESSURE: 130 MMHG | HEART RATE: 102 BPM

## 2022-01-06 DIAGNOSIS — I25.119 CORONARY ARTERY DISEASE INVOLVING NATIVE HEART WITH ANGINA PECTORIS, UNSPECIFIED VESSEL OR LESION TYPE (HCC): ICD-10-CM

## 2022-01-06 DIAGNOSIS — R07.9 CHEST PAIN, UNSPECIFIED TYPE: ICD-10-CM

## 2022-01-06 DIAGNOSIS — Z98.890 S/P CARDIAC CATH: Primary | ICD-10-CM

## 2022-01-06 PROCEDURE — 99214 OFFICE O/P EST MOD 30 MIN: CPT | Performed by: SPECIALIST

## 2022-01-06 PROCEDURE — 93000 ELECTROCARDIOGRAM COMPLETE: CPT | Performed by: SPECIALIST

## 2022-01-06 RX ORDER — ROSUVASTATIN CALCIUM 40 MG/1
TABLET, COATED ORAL
Qty: 90 TABLET | Refills: 1 | Status: SHIPPED | OUTPATIENT
Start: 2022-01-06 | End: 2022-08-22

## 2022-01-06 RX ORDER — ESCITALOPRAM OXALATE 20 MG/1
20 TABLET ORAL DAILY
COMMUNITY
Start: 2021-12-27

## 2022-01-06 RX ORDER — DEXTROAMPHETAMINE SACCHARATE, AMPHETAMINE ASPARTATE, DEXTROAMPHETAMINE SULFATE AND AMPHETAMINE SULFATE 5; 5; 5; 5 MG/1; MG/1; MG/1; MG/1
TABLET ORAL
COMMUNITY
Start: 2021-11-01

## 2022-01-06 RX ORDER — EPINEPHRINE 0.3 MG/.3ML
INJECTION SUBCUTANEOUS
COMMUNITY
Start: 2021-07-14

## 2022-01-06 NOTE — PROGRESS NOTES
.  Visit Vitals  /82 (BP 1 Location: Left upper arm, BP Patient Position: Sitting, BP Cuff Size: Adult)   Pulse (!) 102   Resp 18   Ht 5' 6\" (1.676 m)   Wt 190 lb (86.2 kg)   SpO2 95%   BMI 30.67 kg/m²

## 2022-01-06 NOTE — PATIENT INSTRUCTIONS
Please call [address] [place] [address] about scheduling your stress test at Phone: [phone] an order has been provided. Call us at [phone] if you have any problems with this.      Follow up with Dr. Katy Crisostomo in 6 months

## 2022-01-06 NOTE — TELEPHONE ENCOUNTER
Cardiologist: Dr. Xiomara Thornton    Last appt: 6/14/2021  Future Appointments   Date Time Provider Nic Geni   7/7/2022  9:40 AM MD QUAN Nielson BS AMB       Requested Prescriptions     Signed Prescriptions Disp Refills    rosuvastatin (CRESTOR) 40 mg tablet 90 Tablet 1     Sig: TAKE 1 TABLET BY MOUTH EVERY NIGHT     Authorizing Provider: Jacqualin Canavan     Ordering User: ROLAND MOSQUERA         Refills VO per Dr. Xiomara Thornton.

## 2022-01-06 NOTE — PROGRESS NOTES
[facility]                                                            OFFICE NOTE        Gretta Diaz M.D.,REJI            [place]   1965  [id number]    Date/Time:  1/6/20229:52 AM            SUBJECTIVE:  Since October he has noticed some substernal vague chest discomfort pressure associated with shortness of breath which occurs with activity and is relieved by rest.  To be noted though that this does not occur consistently and sometimes he does activity with no discomfort whatsoever. He has started taking Adderall for ADHD but he tells me that the chest discomfort started prior to starting Adderall. Assessment/Plan  1.  CAD: Status post PCI of obtuse marginal branch and circumflex artery in March 2020.     He has had recurrence of chest discomfort with activities in April 2021 which prompted emergency room visit.     At that time he was seen by Dr. Donald Alvarez and underwent cardiac catheterization.     Cardiac catheterization on April 2021 failed to reveal any recurrent stenosis and ascertained the patency of all stents.     Unable to tolerate imdur for severe HA but now absolutely no recurrent cp continue off imdur for the time being. Now with recurrent chest discomfort. His electrocardiogram today reveals normal sinus rhythm with no significant ST-T changes. Chest discomfort is typical and atypical features. Discussed options. Proceed with treadmill Myoview.         He will continue with aspirin 81 mg daily now off Plavix. He tells me the cholesterol closely followed by his primary care physician.   2.  Hyperlipidemia: Continue with full dose of Crestor.  Lipid panel from April 2021 is acceptable we discussed low HDL and increased triglycerides I want him to decrease his carbohydrate intake.  We have discussed exercise and walking at least 150 minutes a week.     3.  Tobacco abuse: He had stop smoking in July 2020 but he has restarted in January 2021 smoking cessation again stressed.     4.  Colon CA: Status post colon resection closely followed by primary care physician.     5.  Hypertension: Well controlled currently.     Otherwise I will see him back in 6 months if nuclear stress test which will be done at [place] [id number] in [place] fails to reveal any significant ischemia           HPI     59 yo male Patient with a history of hyperlipidemia, colon cancer status post resection and tobacco abuse who presented to [facility] emergency room on March 27, 2020 with chest pain suggestive of unstable angina.     The patient did rule out for myocardial infarction but given the high clinical suspicion for unstable angina he underwent cardiac catheterization as noted below.     S/p PCI of OM and CX on 3/20 residual 40% and 20% in LAD and 50% in RCA     Quit smoking on 7/20            CARDIAC STUDIES        06/25/20    ECHO STRESS 07/06/2020 7/8/2020    Interpretation Summary  · Normal stress echocardiogram. Low risk study. · Baseline ECG: Normal sinus rhythm. · Low risk [facility] treadmill score. · Negative stress test.    Signed by: Carmencita Sever, MD on 7/6/2020  1:42 PM                      04/23/21    CARDIAC PROCEDURE 04/23/2021 4/23/2021    Conclusion  Findings  1. Stable mild to moderate coronary artery disease  2. Patent stents in mid circumflex and obtuse marginal branch placed 1 year ago  3. Low LVEDP    Access right radial no issues    Contrast 36 cc    Recommendations  1. Guideline directed medical therapy for CAD  2. Follow-up with Dr. Tammy Baugh  3.   Evaluate for alternative causes of chest discomfort    Signed by: Bozena Zapata MD on 4/23/2021  3:35 PM          EKG Results     Procedure 720 Value Units Date/Time    AMB POC EKG ROUTINE W/ 12 LEADS, INTER & REP [[id number]] Resulted: 01/06/22 0940    Order Status: Completed Updated: 01/06/22 0100 IMAGING      MRI Results (most recent):  No results found for this or any previous visit. CT Results (most recent):  No results found for this or any previous visit. XR Results (most recent):  Results from Hospital Encounter encounter on 04/23/21    XR CHEST PA LAT    Narrative  INDICATION:  chest pain    COMPARISON: March 2020    FINDINGS: PA and lateral views of the chest demonstrate a stable  cardiomediastinal silhouette and clear lungs bilaterally. The visualized osseous  structures are unremarkable. Impression  No acute process          Past Medical History:   Diagnosis Date    Asthma     CAD (coronary artery disease)     Colon cancer ([address]     colon     Past Surgical History:   Procedure Laterality Date    HX CORONARY STENT PLACEMENT      HX KNEE REPLACEMENT Right     HX TOTAL COLECTOMY      IR ANGIOPLASTY  IC PERC W STNT Left      Social History     Tobacco Use    Smoking status: Current Every Day Smoker     Types: Cigarettes    Smokeless tobacco: Never Used    Tobacco comment: 15 cig a day   Substance Use Topics    Alcohol use: Yes     Alcohol/week: 2.0 standard drinks     Types: 2 Cans of beer per week     Comment: twice a month    Drug use: Not Currently     No family history on file. Allergies   Allergen Reactions    Venom-Honey Bee Anaphylaxis     Reaction Type: Allergy      Bee Pollen Rash         Visit Vitals  /82 (BP 1 Location: Left upper arm, BP Patient Position: Sitting, BP Cuff Size: Adult)   Pulse (!) 102   Resp 18   Ht 5' 6\" (1.676 m)   Wt 190 lb (86.2 kg)   SpO2 95%   BMI 30.67 kg/m²         Last 3 Recorded Weights in this Encounter    01/06/22 0936   Weight: 190 lb (86.2 kg)            Review of Systems:   Pertinent items are noted in the History of Present Illness.        Visit Vitals  /82 (BP 1 Location: Left upper arm, BP Patient Position: Sitting, BP Cuff Size: Adult)   Pulse (!) 102   Resp 18   Ht 5' 6\" (1.676 m)   Wt 190 lb (86.2 kg) SpO2 95%   BMI 30.67 kg/m²     General Appearance:  Well developed, well nourished,alert and oriented x 3, and individual in no acute distress. Ears/Nose/Mouth/Throat:   Hearing grossly normal.         Neck: Supple. Chest:   Lungs clear to auscultation bilaterally. Cardiovascular:  Regular rate and rhythm, S1, S2 normal, no murmur. Abdomen:   Soft, non-tender, bowel sounds are active. Extremities: No edema bilaterally. Skin: Warm and dry. Current Outpatient Medications on File Prior to Visit   Medication Sig Dispense Refill    dextroamphetamine-amphetamine (AdderalL) 20 mg tablet       EPINEPHrine (EPIPEN) 0.3 mg/0.3 mL injection SMARTSI.3 Milliliter(s) IM Once      escitalopram oxalate (LEXAPRO) 20 mg tablet Take 20 mg by mouth daily.  rosuvastatin (CRESTOR) 40 mg tablet Take 1 Tablet by mouth nightly. 90 Tablet 1    albuterol (PROVENTIL HFA, VENTOLIN HFA, PROAIR HFA) 90 mcg/actuation inhaler Take 2 Puffs by inhalation every four (4) hours as needed for Wheezing.  traZODone (DESYREL) 150 mg tablet Take 150 mg by mouth nightly.  aspirin delayed-release 81 mg tablet Take 81 mg by mouth two (2) times a day.  mometasone-formoterol (Dulera) 100-5 mcg/actuation HFA inhaler Take 2 Puffs by inhalation two (2) times a day.  clopidogreL (PLAVIX) 75 mg tab TAKE 1 TABLET BY MOUTH EVERY DAY 90 Tab 1     No current facility-administered medications on file prior to visit. Viktor Gould. [facility] \"Javy\" had no medications administered during this visit. Current Outpatient Medications   Medication Sig    dextroamphetamine-amphetamine (AdderalL) 20 mg tablet     EPINEPHrine (EPIPEN) 0.3 mg/0.3 mL injection SMARTSI.3 Milliliter(s) IM Once    escitalopram oxalate (LEXAPRO) 20 mg tablet Take 20 mg by mouth daily.  rosuvastatin (CRESTOR) 40 mg tablet Take 1 Tablet by mouth nightly.     albuterol (PROVENTIL HFA, VENTOLIN HFA, PROAIR HFA) 90 mcg/actuation inhaler Take 2 Puffs by inhalation every four (4) hours as needed for Wheezing.  traZODone (DESYREL) 150 mg tablet Take 150 mg by mouth nightly.  aspirin delayed-release 81 mg tablet Take 81 mg by mouth two (2) times a day.  mometasone-formoterol (Dulera) 100-5 mcg/actuation HFA inhaler Take 2 Puffs by inhalation two (2) times a day.  clopidogreL (PLAVIX) 75 mg tab TAKE 1 TABLET BY MOUTH EVERY DAY     No current facility-administered medications for this visit. Lab Results   Component Value Date/Time    Cholesterol, total 136 04/20/2021 08:37 AM    HDL Cholesterol 34 (L) 04/20/2021 08:37 AM    LDL, calculated 65 04/20/2021 08:37 AM    LDL, calculated 98 06/22/2020 11:47 AM    VLDL, calculated 37 04/20/2021 08:37 AM    VLDL, calculated 29 06/22/2020 11:47 AM    Triglyceride 223 (H) 04/20/2021 08:37 AM    CHOL/HDL Ratio 7.5 (H) 03/27/2020 06:59 AM       Lab Results   Component Value Date/Time    Sodium 137 04/23/2021 08:00 AM    Potassium 4.0 04/23/2021 08:00 AM    Chloride 106 04/23/2021 08:00 AM    CO2 24 04/23/2021 08:00 AM    Anion gap 7 04/23/2021 08:00 AM    Glucose 103 (H) 04/23/2021 08:00 AM    BUN 9 04/23/2021 08:00 AM    Creatinine 0.91 04/23/2021 08:00 AM    BUN/Creatinine ratio 10 (L) 04/23/2021 08:00 AM    GFR est AA >60 04/23/2021 08:00 AM    GFR est non-AA >60 04/23/2021 08:00 AM    Calcium 9.4 04/23/2021 08:00 AM       Lab Results   Component Value Date/Time    ALT (SGPT) 18 04/23/2021 08:00 AM    Alk.  phosphatase 69 04/23/2021 08:00 AM    Bilirubin, direct 0.11 04/20/2021 08:37 AM    Bilirubin, total 0.4 04/23/2021 08:00 AM       Lab Results   Component Value Date/Time    WBC 8.8 04/23/2021 08:00 AM    HGB 17.7 (H) 04/23/2021 08:00 AM    HCT 51.0 (H) 04/23/2021 08:00 AM    PLATELET 045 91/78/3987 08:00 AM    MCV 88.7 04/23/2021 08:00 AM       No results found for: TSH, TSH2, TSH3, TSHP, TSHEXT      Lab Results   Component Value Date/Time    Cholesterol, total 136 04/20/2021 08:37 AM Cholesterol, total 149 10/19/2020 08:56 AM    Cholesterol, total 158 06/22/2020 11:47 AM    Cholesterol, total 217 (H) 03/27/2020 06:59 AM    HDL Cholesterol 34 (L) 04/20/2021 08:37 AM    HDL Cholesterol 33 (L) 10/19/2020 08:56 AM    HDL Cholesterol 31 (L) 06/22/2020 11:47 AM    HDL Cholesterol 29 03/27/2020 06:59 AM    LDL, calculated 65 04/20/2021 08:37 AM    LDL, calculated 84 10/19/2020 08:56 AM    LDL, calculated 98 06/22/2020 11:47 AM    LDL, calculated 122.8 (H) 03/27/2020 06:59 AM    Triglyceride 223 (H) 04/20/2021 08:37 AM    Triglyceride 189 (H) 10/19/2020 08:56 AM    Triglyceride 147 06/22/2020 11:47 AM    Triglyceride 326 (H) 03/27/2020 06:59 AM    CHOL/HDL Ratio 7.5 (H) 03/27/2020 06:59 AM                Please note that this dictation was completed with [employer], the [employer] voice recognition software. Quite often unanticipated grammatical, syntax, homophones, and other interpretative errors are inadvertently transcribed by the computer software. Please disregard these errors. Please excuse any errors that have escaped final proofreading.

## 2022-03-19 PROBLEM — I25.10 CAD (CORONARY ARTERY DISEASE): Status: ACTIVE | Noted: 2020-03-27

## 2022-03-19 PROBLEM — Z98.890 S/P CARDIAC CATH: Status: ACTIVE | Noted: 2021-04-23

## 2022-03-20 PROBLEM — E78.49 OTHER HYPERLIPIDEMIA: Status: ACTIVE | Noted: 2020-04-14

## 2022-08-22 RX ORDER — ROSUVASTATIN CALCIUM 40 MG/1
TABLET, COATED ORAL
Qty: 90 TABLET | Refills: 0 | Status: SHIPPED | OUTPATIENT
Start: 2022-08-22

## 2022-08-22 NOTE — TELEPHONE ENCOUNTER
Cardiologist: Dr. Jerry Wasserman    Last appt: 1/6/2022  No future appointments. Requested Prescriptions     Signed Prescriptions Disp Refills    rosuvastatin (CRESTOR) 40 mg tablet 90 Tablet 0     Sig: TAKE 1 TABLET BY MOUTH EVERY NIGHT     Authorizing Provider: Clement Berry     Ordering User: ROLAND MOSQUERA VO per Dr. Jerry Wasserman.

## 2023-04-26 NOTE — Clinical Note
Mallampati: Class III - soft palate, base of uvula visible. ASA: Class 3 - patient with severe systemic disease. Class II - visualization of the soft palate, fauces, and uvula

## 2024-01-06 NOTE — ROUTINE PROCESS
TRANSFER - OUT REPORT: 
 
Verbal report given to Kinza on Zina Katherine  being transferred to cath lab for ordered procedure Report consisted of patients Situation, Background, Assessment and  
Recommendations(SBAR). Information from the following report(s) SBAR, Kardex, ED Summary, [facility] and Recent Results was reviewed with the receiving nurse. Lines:  
Peripheral IV 04/23/21 Left Forearm (Active) Site Assessment Clean, dry, & intact 04/23/21 0759 Phlebitis Assessment 0 04/23/21 0759 Infiltration Assessment 0 04/23/21 0759 Dressing Status Clean, dry, & intact 04/23/21 0759 Dressing Type Transparent 04/23/21 6445 Opportunity for questions and clarification was provided. Patient transported with: 
 Registered Nurse, monitor Low Risk (score 7-11)

## 2024-01-24 NOTE — Clinical Note
Lab work and CT scan did not show any acute abnormalities today.  He can try Tylenol or Motrin as needed for pain.    Return for worsening symptoms or any new concerns.   Lesion located in the Mid Cx. Balloon inserted. Balloon inflated using multiple inflations inflation technique. Lesion #1: Pressure = 16 jesús; Duration = 30 sec. Inflation 2: Pressure = 14 jesús; Duration = 28 sec.

## 2024-04-15 ENCOUNTER — TRANSCRIBE ORDERS (OUTPATIENT)
Facility: HOSPITAL | Age: 59
End: 2024-04-15

## 2024-04-15 DIAGNOSIS — S46.211A: Primary | ICD-10-CM

## 2024-04-16 ENCOUNTER — HOSPITAL ENCOUNTER (OUTPATIENT)
Facility: HOSPITAL | Age: 59
Discharge: HOME | End: 2024-04-19
Attending: ORTHOPAEDIC SURGERY
Payer: COMMERCIAL

## 2024-04-16 DIAGNOSIS — S46.211A: ICD-10-CM

## 2024-04-16 PROCEDURE — 73221 MRI JOINT UPR EXTREM W/O DYE: CPT

## (undated) DEVICE — SPLINT WR POS F/ARTERIAL ACC -- BX/10

## (undated) DEVICE — ANGIOGRAPHY KIT

## (undated) DEVICE — CATH BLLN ANGIO 2.75X15MM SC EUPHORA RX

## (undated) DEVICE — CATH GUID COR EB35 6FR 100CM -- LAUNCHER

## (undated) DEVICE — KIT MFLD ISOLATN NACL CNTRST PRT TBNG SPIK W/ PRSS TRNSDUC

## (undated) DEVICE — CUSTOM KT PTCA INFL DEV K05 00052M

## (undated) DEVICE — ROYAL SILK SURGICAL GOWN, XXL: Brand: CONVERTORS

## (undated) DEVICE — ANGIOGRAPHIC CATHETER: Brand: IMPULSE™

## (undated) DEVICE — TR BAND RADIAL ARTERY COMPRESSION DEVICE: Brand: TR BAND

## (undated) DEVICE — CATH BLLN ANGIO 2.25X8MM NC EUPHORIA RX

## (undated) DEVICE — KIT HND CTRL 3 W STPCOCK ROT END 54IN PREM HI PRSS TBNG AT

## (undated) DEVICE — RUNTHROUGH NS EXTRA FLOPPY PTCA GUIDEWIRE: Brand: RUNTHROUGH

## (undated) DEVICE — COPILOT BLEEDBACK CONTROL VALVE: Brand: COPILOT

## (undated) DEVICE — SPECIAL PROCEDURE DRAPE 32" X 34": Brand: SPECIAL PROCEDURE DRAPE

## (undated) DEVICE — Device: Brand: ASAHI SION BLUE

## (undated) DEVICE — GUIDEWIRE VASC L260CM DIA0.035IN TIP L3MM STD EXCHG PTFE J

## (undated) DEVICE — CATH BLLN DIL 2.0X12MM RX -- EUPHORA

## (undated) DEVICE — PACK PROCEDURE SURG HRT CATH

## (undated) DEVICE — KIT MED IMAG CNTRST AGNT W/ IOPAMIDOL REUSE

## (undated) DEVICE — GLIDESHEATH SLENDER STAINLESS STEEL KIT: Brand: GLIDESHEATH SLENDER

## (undated) DEVICE — CATH ANGI BLLN DIL 3.5X12MM -- NC EUPHORA

## (undated) DEVICE — Device: Brand: EAGLE EYE PLATINUM RX DIGITAL IVUS CATHETER